# Patient Record
Sex: MALE | Race: WHITE | NOT HISPANIC OR LATINO | Employment: OTHER | URBAN - METROPOLITAN AREA
[De-identification: names, ages, dates, MRNs, and addresses within clinical notes are randomized per-mention and may not be internally consistent; named-entity substitution may affect disease eponyms.]

---

## 2018-02-04 ENCOUNTER — HOSPITAL ENCOUNTER (EMERGENCY)
Facility: OTHER | Age: 58
Discharge: HOME | End: 2018-02-04
Attending: EMERGENCY MEDICINE
Payer: COMMERCIAL

## 2018-02-04 VITALS
HEIGHT: 69 IN | BODY MASS INDEX: 29.62 KG/M2 | WEIGHT: 200 LBS | DIASTOLIC BLOOD PRESSURE: 89 MMHG | OXYGEN SATURATION: 97 % | SYSTOLIC BLOOD PRESSURE: 140 MMHG | RESPIRATION RATE: 16 BRPM | HEART RATE: 73 BPM | TEMPERATURE: 99 F

## 2018-02-04 DIAGNOSIS — L03.314 CELLULITIS OF LEFT GROIN: ICD-10-CM

## 2018-02-04 DIAGNOSIS — L02.214 ABSCESS OF LEFT GROIN: Primary | ICD-10-CM

## 2018-02-04 LAB — POCT GLUCOSE: 139 MG/DL (ref 70–110)

## 2018-02-04 PROCEDURE — 99283 EMERGENCY DEPT VISIT LOW MDM: CPT | Mod: 25

## 2018-02-04 PROCEDURE — 82962 GLUCOSE BLOOD TEST: CPT

## 2018-02-04 PROCEDURE — 25000003 PHARM REV CODE 250: Performed by: PHYSICIAN ASSISTANT

## 2018-02-04 PROCEDURE — 10061 I&D ABSCESS COMP/MULTIPLE: CPT

## 2018-02-04 RX ORDER — CLINDAMYCIN HYDROCHLORIDE 150 MG/1
450 CAPSULE ORAL 3 TIMES DAILY
Qty: 90 CAPSULE | Refills: 0 | Status: SHIPPED | OUTPATIENT
Start: 2018-02-04 | End: 2018-02-14

## 2018-02-04 RX ORDER — LIDOCAINE HYDROCHLORIDE 10 MG/ML
10 INJECTION INFILTRATION; PERINEURAL
Status: COMPLETED | OUTPATIENT
Start: 2018-02-04 | End: 2018-02-04

## 2018-02-04 RX ORDER — HYDROCODONE BITARTRATE AND ACETAMINOPHEN 5; 325 MG/1; MG/1
1 TABLET ORAL EVERY 6 HOURS PRN
Qty: 18 TABLET | Refills: 0 | Status: ON HOLD | OUTPATIENT
Start: 2018-02-04 | End: 2018-06-01 | Stop reason: CLARIF

## 2018-02-04 RX ORDER — HYDROCODONE BITARTRATE AND ACETAMINOPHEN 5; 325 MG/1; MG/1
1 TABLET ORAL
Status: COMPLETED | OUTPATIENT
Start: 2018-02-04 | End: 2018-02-04

## 2018-02-04 RX ADMIN — LIDOCAINE HYDROCHLORIDE 10 ML: 10 INJECTION, SOLUTION INFILTRATION; PERINEURAL at 10:02

## 2018-02-04 RX ADMIN — HYDROCODONE BITARTRATE AND ACETAMINOPHEN 1 TABLET: 5; 325 TABLET ORAL at 10:02

## 2018-02-04 NOTE — ED TRIAGE NOTES
"Patient presents to ER w/ reports of + left sided groin "cyst". Pt states," I get them all the time in this same spot. Sometimes they drain and just go away sometimes not. I can't seem to get this one to drain". Denies fever or chills.   "

## 2018-02-04 NOTE — ED NOTES
Two patient identifiers have been checked and are correct.      Appearance: Pt awake, alert & oriented to person, place & time. Pt in no acute distress at present time. Pt is clean and well groomed with clothes appropriately fastened.   Skin: Skin warm, dry & intact. Color consistent with ethnicity. Mucous membranes moist. No breakdown or brusing noted. + red, raised area noted to left side of groin, + warmth, pain w/ tenderness noted to site. No swelling noted to testicles.   Musculoskeletal: Patient moving all extremities well, no obvious swelling or deformities noted.   Respiratory: Respirations spontaneous, even, and non-labored. Visible chest rise noted. Airway is open and patent. No accessory muscle use noted.   Neurologic: Sensation is intact. Speech is clear and appropriate. Eyes open spontaneously, behavior appropriate to situation, follows commands, facial expression symmetrical, bilateral hand grasp equal and even, purposeful motor response noted.  Cardiac: All peripheral pulses present. No Bilateral lower extremity edema. Cap refill is <3 seconds.  Fall risk band applied to pt  Family remains at bedside.   Abdomen: Abdomen soft, non-tender to palpation.   : Pt reports no dysuria or hematuria.

## 2018-02-04 NOTE — ED PROVIDER NOTES
"Encounter Date: 2/4/2018       History     Chief Complaint   Patient presents with    Abscess     pt with c/o left groin "cyst" x one week.     57-year-old male with hypertension, coronary artery disease and esophageal spasm who is status post multiple cardiac stents and CABG presents to the emergency department with complaints of a "cyst" to the left groin.  He states that he's had these in the past.  He reports that he has had them drained previously.  He reports that his primary care physician called him in prescription for Bactrim and Cipro which she has been taking for the last couple of days.  He denies any drainage at this time, fever, chills.  He denies any history of diabetes.  He complains of pain that is a 4 out of 10.      The history is provided by the patient and the spouse.     Review of patient's allergies indicates:   Allergen Reactions    Plavix [clopidogrel] Itching     Past Medical History:   Diagnosis Date    Coronary artery disease     Esophageal spasm     Hypertension      Past Surgical History:   Procedure Laterality Date    Cardiac stents      CARDIAC SURGERY  3/2012    CABG  X  3    CORONARY ARTERY BYPASS GRAFT      triple    Ex. biopsy right neck      TONSILLECTOMY       Family History   Problem Relation Age of Onset    Heart disease Mother     Diabetes Mother     Heart disease Father     Heart disease Sister     Diabetes Sister     Heart disease Brother     Stroke Maternal Grandmother      Social History   Substance Use Topics    Smoking status: Former Smoker     Quit date: 7/15/2015    Smokeless tobacco: Never Used      Comment: 6  Black & Mild cigars per day    Alcohol use Yes      Comment: Occasionally     Review of Systems   Constitutional: Negative for chills and fever.   HENT: Negative for sore throat.    Respiratory: Negative for shortness of breath.    Cardiovascular: Negative for chest pain.   Gastrointestinal: Negative for nausea and vomiting. "   Genitourinary: Positive for scrotal swelling. Negative for dysuria, flank pain, frequency, hematuria and penile pain.        Cyst left groin   Musculoskeletal: Negative for back pain.   Skin: Positive for color change. Negative for rash and wound.   Neurological: Negative for weakness.   Hematological: Does not bruise/bleed easily.       Physical Exam     Initial Vitals [02/04/18 0929]   BP Pulse Resp Temp SpO2   (!) 153/82 93 18 98.5 °F (36.9 °C) 98 %      MAP       105.67         Physical Exam    Nursing note and vitals reviewed.  Constitutional: Vital signs are normal. He appears well-developed and well-nourished.  Non-toxic appearance. No distress.   HENT:   Head: Normocephalic and atraumatic.   Right Ear: External ear normal.   Left Ear: External ear normal.   Nose: Nose normal.   Eyes: Conjunctivae, EOM and lids are normal. Pupils are equal, round, and reactive to light. No scleral icterus.   Neck: Normal range of motion and phonation normal. Neck supple.   Abdominal: Normal appearance. There is no rebound.   Musculoskeletal: Normal range of motion.   No obvious deformities, moving all extremities, normal gait   Neurological: He is alert and oriented to person, place, and time. He has normal strength. No sensory deficit.   Skin: Skin is warm, dry and intact. Capillary refill takes less than 2 seconds. Abscess noted. No lesion and no rash noted. There is erythema.   Patient with area of erythema and induration without palpable fluctuance or drainage to the left groin.  It does not extend into the scrotum.  The area measures approximately 8 cm x 5 cm.   Psychiatric: He has a normal mood and affect. His speech is normal and behavior is normal. Judgment normal. Cognition and memory are normal.         ED Course   I & D - Incision and Drainage  Date/Time: 2/4/2018 11:02 AM  Performed by: ASHLI KUMAR  Authorized by: SARITA OROURKE   Consent Done: Not Needed  Type: abscess  Body area:  anogenital  Anesthesia: local infiltration    Anesthesia:  Local Anesthetic: lidocaine 1% without epinephrine  Anesthetic total: 6 mL  Patient sedated: no  Scalpel size: 11  Incision type: single straight  Complexity: complex  Drainage: bloody  Drainage amount: moderate  Wound treatment: incision and  drainage  Complications: No  Specimens: No  Implants: No  Patient tolerance: Patient tolerated the procedure well with no immediate complications        Labs Reviewed   POCT GLUCOSE - Abnormal; Notable for the following:        Result Value    POCT Glucose 139 (*)     All other components within normal limits   POCT GLUCOSE MONITORING CONTINUOUS             Medical Decision Making:   History:   I obtained history from: someone other than patient.       <> Summary of History: wife  Old Medical Records: I decided to obtain old medical records.  Initial Assessment:   57-year-old male with complaints consistent with abscess and cellulitis to the left groin.  Afebrile and neurovascularly intact.  He is alert, healthy and nontoxic appearing.  He is in no apparent distress.  No focal neurological deficits.  Exam documented above.  Cellulitis noted to the left groin with concerns for underlying abscess.  Does not involve the scrotum.  ED Management:  Patient's fingerstick glucose is 139.  Attempted to drain abscess with 11 blade.  The area was anesthetized using 1% lidocaine without epinephrine.  Patient tolerated well without immediate complications.  No purulent drainage expressed from the site.  Patient did have some bloody drainage.  At this time I did discuss with patient concerns for failed outpatient treatment as well as obtaining labs and possible admission.  Patient states that he does not wish to be admitted at this time.  Discussed with him  the risks which included possible worsening of symptoms, serious bacterial infection, sepsis, Fannie's gangrene.  Patient still declines at this time.  Will change his  antibiotics to clindamycin and given care instructions as well as strict return precautions.  We'll also discharge home with [place] for pain.  He and his wife state understanding.  He is to follow-up in the next 48 hours or return for any worsening signs or symptoms as directed.  This patient was discussed with the attending physician who agrees with treatment plan.    Other:   I have discussed this case with another health care provider.       <> Summary of the Discussion: Kisha  This note was created using Dragon Medical dictation.  There may be typographical errors secondary to dictation.                     ED Course      Clinical Impression:     1. Abscess of left groin    2. Cellulitis of left groin          Disposition:   Disposition: Discharged  Condition: Stable                        Sarah Gonsalez PA-C  02/04/18 1108

## 2018-04-26 ENCOUNTER — OFFICE VISIT (OUTPATIENT)
Dept: CARDIOLOGY | Facility: CLINIC | Age: 58
End: 2018-04-26
Attending: INTERNAL MEDICINE
Payer: COMMERCIAL

## 2018-04-26 VITALS
SYSTOLIC BLOOD PRESSURE: 127 MMHG | HEIGHT: 69 IN | BODY MASS INDEX: 31.25 KG/M2 | DIASTOLIC BLOOD PRESSURE: 81 MMHG | WEIGHT: 211 LBS | HEART RATE: 86 BPM

## 2018-04-26 DIAGNOSIS — I20.9 ANGINA PECTORIS: ICD-10-CM

## 2018-04-26 DIAGNOSIS — I25.119 ATHEROSCLEROSIS OF NATIVE CORONARY ARTERY OF NATIVE HEART WITH ANGINA PECTORIS: ICD-10-CM

## 2018-04-26 DIAGNOSIS — I10 ESSENTIAL HYPERTENSION: ICD-10-CM

## 2018-04-26 DIAGNOSIS — Z95.1 HX OF CABG: ICD-10-CM

## 2018-04-26 DIAGNOSIS — F17.200 CURRENT SMOKER: ICD-10-CM

## 2018-04-26 DIAGNOSIS — I20.9 ANGINAL SYNDROME: Primary | ICD-10-CM

## 2018-04-26 PROCEDURE — 3079F DIAST BP 80-89 MM HG: CPT | Mod: CPTII,S$GLB,, | Performed by: INTERNAL MEDICINE

## 2018-04-26 PROCEDURE — 3074F SYST BP LT 130 MM HG: CPT | Mod: CPTII,S$GLB,, | Performed by: INTERNAL MEDICINE

## 2018-04-26 PROCEDURE — 99214 OFFICE O/P EST MOD 30 MIN: CPT | Mod: S$GLB,,, | Performed by: INTERNAL MEDICINE

## 2018-04-26 NOTE — PROGRESS NOTES
Subjective:    Patient ID:  Rigo Garcia is a 57 y.o. male     HPI  Here for F/U of CAD, CABG, LASHON in proximal LCx in 9/2015, Angina, HBP, smoker.    I get angina on exertion. I now smoke 11/2 packs per day. I would like to try Chantix.    Current Outpatient Prescriptions   Medication Sig    aspirin 325 MG tablet Take 1 tablet (325 mg total) by mouth once daily.    clopidogrel (PLAVIX) 75 mg tablet TAKE ONE TABLET BY MOUTH DAILY    FOLBIC 2.5-25-2 mg Tab TAKE ONE TABLET BY MOUTH DAILY    hydroCHLOROthiazide (HYDRODIURIL) 25 MG tablet TAKE ONE TABLET BY MOUTH EVERY DAY    hydrocodone-acetaminophen 5-325mg (NORCO) 5-325 mg per tablet Take 1 tablet by mouth every 6 (six) hours as needed for Pain.    metoprolol succinate (TOPROL-XL) 50 MG 24 hr tablet TAKE ONE TABLET BY MOUTH TWICE DAILY    NIFEdipine (PROCARDIA-XL) 30 MG (OSM) 24 hr tablet TAKE ONE TABLET BY MOUTH TWICE DAILY    nitroGLYCERIN (NITROSTAT) 0.4 MG SL tablet TAKE 1 UNDER THE TONGUE EVERY 5 MINUTES FOR 3 DOSES AS NEEDED FOR CHEST PAIN/IF NO RELIEF CALL ER    omeprazole (PRILOSEC) 40 MG capsule Take 40 mg by mouth every morning.     RANEXA 500 mg Tb12     rosuvastatin (CRESTOR) 10 MG tablet Take 10 mg by mouth every Monday and Thursday.    testosterone cypionate (DEPOTESTOTERONE CYPIONATE) 200 mg/mL injection Inject into the muscle every 14 (fourteen) days.     No current facility-administered medications for this visit.          Review of Systems   Constitution: Negative for chills, decreased appetite, fever, weight gain and weight loss.   HENT: Negative for congestion, hearing loss and sore throat.    Eyes: Negative for blurred vision, double vision and visual disturbance.   Cardiovascular: Positive for chest pain. Negative for claudication, dyspnea on exertion, leg swelling, palpitations and syncope.   Respiratory: Negative for cough, hemoptysis, shortness of breath, sputum production and wheezing.    Endocrine: Negative for cold  "intolerance and heat intolerance.   Hematologic/Lymphatic: Negative for bleeding problem. Does not bruise/bleed easily.   Skin: Negative for color change, dry skin, flushing and itching.   Musculoskeletal: Negative for back pain, joint pain and myalgias.   Gastrointestinal: Negative for abdominal pain, anorexia, constipation, diarrhea, dysphagia, nausea and vomiting.        No bleeding per rectum   Genitourinary: Negative for dysuria, flank pain, frequency, hematuria and nocturia.   Neurological: Negative for dizziness, headaches, light-headedness, loss of balance, seizures and tremors.   Psychiatric/Behavioral: Negative for altered mental status and depression.         Vitals:    04/26/18 1428   BP: 127/81   Pulse: 86   Weight: 95.7 kg (211 lb)   Height: 5' 9" (1.753 m)     Objective:    Physical Exam   Constitutional: He is oriented to person, place, and time. He appears well-developed and well-nourished.   HENT:   Head: Normocephalic and atraumatic.   Right Ear: External ear normal.   Left Ear: External ear normal.   Nose: Nose normal.   Eyes: Conjunctivae and EOM are normal. Pupils are equal, round, and reactive to light. No scleral icterus.   Neck: Normal range of motion. Neck supple. No JVD present. No tracheal deviation present. No thyromegaly present.   Cardiovascular: Normal rate, regular rhythm and normal heart sounds.  Exam reveals no gallop and no friction rub.    No murmur heard.  Pulmonary/Chest: Effort normal and breath sounds normal. No respiratory distress. He has no rales. He exhibits no tenderness.   Abdominal: Soft. Bowel sounds are normal. He exhibits no distension and no mass. There is no tenderness.   Musculoskeletal: Normal range of motion. He exhibits no edema or tenderness.   Lymphadenopathy:     He has no cervical adenopathy.   Neurological: He is alert and oriented to person, place, and time. He has normal reflexes. No cranial nerve deficit. Coordination normal.   Skin: Skin is warm and " dry. No rash noted.   Psychiatric: He has a normal mood and affect. His behavior is normal.         Assessment:       1. Anginal syndrome    2. Angina pectoris    3. Atherosclerosis of native coronary artery of native heart with angina pectoris    4. Hx of CABG    5. Essential hypertension    6. Current smoker         Plan:       Rx Chantix  Lexiscan CL test.

## 2018-05-24 ENCOUNTER — CLINICAL SUPPORT (OUTPATIENT)
Dept: CARDIOLOGY | Facility: CLINIC | Age: 58
End: 2018-05-24
Payer: COMMERCIAL

## 2018-05-24 DIAGNOSIS — I20.9 ANGINA PECTORIS: ICD-10-CM

## 2018-05-24 PROCEDURE — A9500 TC99M SESTAMIBI: HCPCS | Mod: S$GLB,,, | Performed by: INTERNAL MEDICINE

## 2018-05-24 PROCEDURE — 78452 HT MUSCLE IMAGE SPECT MULT: CPT | Mod: S$GLB,,, | Performed by: INTERNAL MEDICINE

## 2018-05-24 PROCEDURE — 93015 CV STRESS TEST SUPVJ I&R: CPT | Mod: S$GLB,,, | Performed by: INTERNAL MEDICINE

## 2018-05-25 ENCOUNTER — TELEPHONE (OUTPATIENT)
Dept: CARDIOLOGY | Facility: CLINIC | Age: 58
End: 2018-05-25

## 2018-05-29 ENCOUNTER — OFFICE VISIT (OUTPATIENT)
Dept: CARDIOLOGY | Facility: CLINIC | Age: 58
End: 2018-05-29
Attending: INTERNAL MEDICINE
Payer: COMMERCIAL

## 2018-05-29 VITALS
HEIGHT: 69 IN | DIASTOLIC BLOOD PRESSURE: 87 MMHG | WEIGHT: 211 LBS | HEART RATE: 90 BPM | SYSTOLIC BLOOD PRESSURE: 131 MMHG | BODY MASS INDEX: 31.25 KG/M2

## 2018-05-29 DIAGNOSIS — I10 ESSENTIAL HYPERTENSION: ICD-10-CM

## 2018-05-29 DIAGNOSIS — I20.9 ANGINA PECTORIS: Primary | ICD-10-CM

## 2018-05-29 DIAGNOSIS — Z95.1 HX OF CABG: ICD-10-CM

## 2018-05-29 DIAGNOSIS — F17.200 CURRENT SMOKER: ICD-10-CM

## 2018-05-29 PROCEDURE — 3008F BODY MASS INDEX DOCD: CPT | Mod: CPTII,S$GLB,, | Performed by: INTERNAL MEDICINE

## 2018-05-29 PROCEDURE — 99214 OFFICE O/P EST MOD 30 MIN: CPT | Mod: S$GLB,,, | Performed by: INTERNAL MEDICINE

## 2018-05-29 PROCEDURE — 3075F SYST BP GE 130 - 139MM HG: CPT | Mod: CPTII,S$GLB,, | Performed by: INTERNAL MEDICINE

## 2018-05-29 PROCEDURE — 3079F DIAST BP 80-89 MM HG: CPT | Mod: CPTII,S$GLB,, | Performed by: INTERNAL MEDICINE

## 2018-05-29 NOTE — PROGRESS NOTES
Subjective:    Patient ID:  Rigo Garcia is a 58 y.o. male     HPI  here for follow-up of angina pectoris, coronary artery disease, history of previous aortocoronary bypass surgery, essential hypertension, current cigarette smoker.  He last had a coronary intervention with a 3.5 mm drug-eluting stent in the proximal left circumflex coronary artery in 2015.    I now get short of breath especially when I am carrying something and get a light heaviness across the chest.  It goes away pretty soon, I do not need to take nitroglycerin.  The Cardiolite test was positive for reversible ischemia.    I smoke more than a pack of cigarettes a day.    Current Outpatient Prescriptions   Medication Sig    aspirin 325 MG tablet Take 1 tablet (325 mg total) by mouth once daily.    clopidogrel (PLAVIX) 75 mg tablet TAKE ONE TABLET BY MOUTH DAILY    FOLBIC 2.5-25-2 mg Tab TAKE ONE TABLET BY MOUTH DAILY    hydroCHLOROthiazide (HYDRODIURIL) 25 MG tablet TAKE ONE TABLET BY MOUTH EVERY DAY    hydrocodone-acetaminophen 5-325mg (NORCO) 5-325 mg per tablet Take 1 tablet by mouth every 6 (six) hours as needed for Pain.    metoprolol succinate (TOPROL-XL) 50 MG 24 hr tablet TAKE ONE TABLET BY MOUTH TWICE DAILY    NIFEdipine (PROCARDIA-XL) 30 MG (OSM) 24 hr tablet TAKE ONE TABLET BY MOUTH TWICE DAILY    nitroGLYCERIN (NITROSTAT) 0.4 MG SL tablet TAKE 1 UNDER THE TONGUE EVERY 5 MINUTES FOR 3 DOSES AS NEEDED FOR CHEST PAIN/IF NO RELIEF CALL ER    omeprazole (PRILOSEC) 40 MG capsule Take 40 mg by mouth every morning.     RANEXA 500 mg Tb12     rosuvastatin (CRESTOR) 10 MG tablet Take 10 mg by mouth every Monday and Thursday.    testosterone cypionate (DEPOTESTOTERONE CYPIONATE) 200 mg/mL injection Inject into the muscle every 14 (fourteen) days.     No current facility-administered medications for this visit.          Review of Systems   Constitution: Negative for chills, decreased appetite, fever, weight gain and weight loss.  "  HENT: Negative for congestion, hearing loss and sore throat.    Eyes: Negative for blurred vision, double vision and visual disturbance.   Cardiovascular: Positive for chest pain and dyspnea on exertion. Negative for claudication, leg swelling, palpitations and syncope.   Respiratory: Negative for cough, hemoptysis, shortness of breath, sputum production and wheezing.    Endocrine: Negative for cold intolerance and heat intolerance.   Hematologic/Lymphatic: Negative for bleeding problem. Does not bruise/bleed easily.   Skin: Negative for color change, dry skin, flushing and itching.   Musculoskeletal: Negative for back pain, joint pain and myalgias.   Gastrointestinal: Negative for abdominal pain, anorexia, constipation, diarrhea, dysphagia, nausea and vomiting.        No bleeding per rectum   Genitourinary: Negative for dysuria, flank pain, frequency, hematuria and nocturia.   Neurological: Negative for dizziness, headaches, light-headedness, loss of balance, seizures and tremors.   Psychiatric/Behavioral: Negative for altered mental status and depression.         Vitals:    05/29/18 1311   BP: 131/87   Pulse: 90   Weight: 95.7 kg (211 lb)   Height: 5' 9" (1.753 m)     Objective:    Physical Exam   Constitutional: He is oriented to person, place, and time. He appears well-developed and well-nourished.   HENT:   Head: Normocephalic and atraumatic.   Right Ear: External ear normal.   Left Ear: External ear normal.   Nose: Nose normal.   Eyes: Conjunctivae and EOM are normal. Pupils are equal, round, and reactive to light. No scleral icterus.   Neck: Normal range of motion. Neck supple. No JVD present. No tracheal deviation present. No thyromegaly present.   Cardiovascular: Normal rate, regular rhythm and normal heart sounds.  Exam reveals no gallop and no friction rub.    No murmur heard.  Pulmonary/Chest: Effort normal and breath sounds normal. No respiratory distress. He has no rales. He exhibits no tenderness. "   Abdominal: Soft. Bowel sounds are normal. He exhibits no distension and no mass. There is no tenderness.   Musculoskeletal: Normal range of motion. He exhibits no edema or tenderness.   Lymphadenopathy:     He has no cervical adenopathy.   Neurological: He is alert and oriented to person, place, and time. He has normal reflexes. No cranial nerve deficit. Coordination normal.   Skin: Skin is warm and dry. No rash noted.   Psychiatric: He has a normal mood and affect. His behavior is normal.       the Cardiolite images reviewed with the patient and his wife    Assessment:       1. Angina pectoris    2. Hx of CABG    3. Essential hypertension    4. Current smoker         Plan:       Plan coronary angiography and possible transcatheter therapeutic  Intervention as warranted.  Discussed in great details with patient and his wife.    Again urged smoking cessation.  Given a prescription for Nicoderm patches.

## 2018-05-31 DIAGNOSIS — I20.9 ANGINAL SYNDROME: ICD-10-CM

## 2018-06-01 ENCOUNTER — HOSPITAL ENCOUNTER (OUTPATIENT)
Facility: OTHER | Age: 58
Discharge: HOME | End: 2018-06-01
Attending: INTERNAL MEDICINE | Admitting: INTERNAL MEDICINE
Payer: COMMERCIAL

## 2018-06-01 ENCOUNTER — SURGERY (OUTPATIENT)
Age: 58
End: 2018-06-01

## 2018-06-01 VITALS
BODY MASS INDEX: 30.36 KG/M2 | HEART RATE: 71 BPM | RESPIRATION RATE: 20 BRPM | HEIGHT: 69 IN | SYSTOLIC BLOOD PRESSURE: 127 MMHG | WEIGHT: 205 LBS | OXYGEN SATURATION: 95 % | DIASTOLIC BLOOD PRESSURE: 61 MMHG | TEMPERATURE: 98 F

## 2018-06-01 DIAGNOSIS — R94.30 ABNORMAL RESULT OF CARDIOVASCULAR FUNCTION STUDY: ICD-10-CM

## 2018-06-01 DIAGNOSIS — I25.119 ATHEROSCLEROSIS OF NATIVE CORONARY ARTERY OF NATIVE HEART WITH ANGINA PECTORIS: Primary | ICD-10-CM

## 2018-06-01 LAB
ANION GAP SERPL CALC-SCNC: 11 MMOL/L
BASOPHILS # BLD AUTO: 0.06 K/UL
BASOPHILS NFR BLD: 0.7 %
BUN SERPL-MCNC: 16 MG/DL
CALCIUM SERPL-MCNC: 10.5 MG/DL
CHLORIDE SERPL-SCNC: 104 MMOL/L
CO2 SERPL-SCNC: 24 MMOL/L
CORONARY STENOSIS: ABNORMAL
CREAT SERPL-MCNC: 1.1 MG/DL
DIFFERENTIAL METHOD: NORMAL
EOSINOPHIL # BLD AUTO: 0.3 K/UL
EOSINOPHIL NFR BLD: 3.1 %
ERYTHROCYTE [DISTWIDTH] IN BLOOD BY AUTOMATED COUNT: 13.6 %
EST. GFR  (AFRICAN AMERICAN): >60 ML/MIN/1.73 M^2
EST. GFR  (NON AFRICAN AMERICAN): >60 ML/MIN/1.73 M^2
GLUCOSE SERPL-MCNC: 106 MG/DL
HCT VFR BLD AUTO: 49.8 %
HGB BLD-MCNC: 16.8 G/DL
LYMPHOCYTES # BLD AUTO: 2.7 K/UL
LYMPHOCYTES NFR BLD: 30.3 %
MCH RBC QN AUTO: 30.4 PG
MCHC RBC AUTO-ENTMCNC: 33.7 G/DL
MCV RBC AUTO: 90 FL
MONOCYTES # BLD AUTO: 0.9 K/UL
MONOCYTES NFR BLD: 10 %
NEUTROPHILS # BLD AUTO: 5 K/UL
NEUTROPHILS NFR BLD: 55.6 %
PLATELET # BLD AUTO: 191 K/UL
PMV BLD AUTO: 10.6 FL
POTASSIUM SERPL-SCNC: 3.8 MMOL/L
RBC # BLD AUTO: 5.52 M/UL
SODIUM SERPL-SCNC: 139 MMOL/L
WBC # BLD AUTO: 8.94 K/UL

## 2018-06-01 PROCEDURE — 25000003 PHARM REV CODE 250: Performed by: INTERNAL MEDICINE

## 2018-06-01 PROCEDURE — 80048 BASIC METABOLIC PNL TOTAL CA: CPT

## 2018-06-01 PROCEDURE — 85025 COMPLETE CBC W/AUTO DIFF WBC: CPT

## 2018-06-01 PROCEDURE — 25000003 PHARM REV CODE 250

## 2018-06-01 PROCEDURE — C1769 GUIDE WIRE: HCPCS

## 2018-06-01 PROCEDURE — 63600175 PHARM REV CODE 636 W HCPCS

## 2018-06-01 PROCEDURE — 36415 COLL VENOUS BLD VENIPUNCTURE: CPT

## 2018-06-01 PROCEDURE — 25500020 PHARM REV CODE 255

## 2018-06-01 RX ORDER — HYDROCODONE BITARTRATE AND ACETAMINOPHEN 10; 325 MG/1; MG/1
1 TABLET ORAL EVERY 4 HOURS PRN
Status: DISCONTINUED | OUTPATIENT
Start: 2018-06-01 | End: 2018-06-01 | Stop reason: HOSPADM

## 2018-06-01 RX ORDER — DIPHENHYDRAMINE HCL 25 MG
25 CAPSULE ORAL ONCE
Status: COMPLETED | OUTPATIENT
Start: 2018-06-01 | End: 2018-06-01

## 2018-06-01 RX ORDER — DIAZEPAM 5 MG/1
5 TABLET ORAL
Status: COMPLETED | OUTPATIENT
Start: 2018-06-01 | End: 2018-06-01

## 2018-06-01 RX ORDER — SODIUM CHLORIDE 9 MG/ML
50 INJECTION, SOLUTION INTRAVENOUS CONTINUOUS
Status: DISPENSED | OUTPATIENT
Start: 2018-06-01 | End: 2018-06-01

## 2018-06-01 RX ORDER — HYDROCODONE BITARTRATE AND ACETAMINOPHEN 5; 325 MG/1; MG/1
1 TABLET ORAL EVERY 4 HOURS PRN
Status: DISCONTINUED | OUTPATIENT
Start: 2018-06-01 | End: 2018-06-01 | Stop reason: HOSPADM

## 2018-06-01 RX ORDER — NITROGLYCERIN 0.4 MG/1
0.4 TABLET SUBLINGUAL
Status: DISCONTINUED | OUTPATIENT
Start: 2018-06-01 | End: 2018-06-01 | Stop reason: HOSPADM

## 2018-06-01 RX ORDER — ROSUVASTATIN CALCIUM 20 MG/1
20 TABLET, COATED ORAL DAILY
Qty: 30 TABLET | Refills: 6 | Status: SHIPPED | OUTPATIENT
Start: 2018-06-01 | End: 2018-12-12 | Stop reason: SDUPTHER

## 2018-06-01 RX ORDER — NAPROXEN SODIUM 220 MG/1
81 TABLET, FILM COATED ORAL
Status: DISCONTINUED | OUTPATIENT
Start: 2018-06-01 | End: 2018-06-01 | Stop reason: HOSPADM

## 2018-06-01 RX ORDER — ONDANSETRON 2 MG/ML
4 INJECTION INTRAMUSCULAR; INTRAVENOUS EVERY 12 HOURS PRN
Status: DISCONTINUED | OUTPATIENT
Start: 2018-06-01 | End: 2018-06-01 | Stop reason: HOSPADM

## 2018-06-01 RX ORDER — NITROGLYCERIN 0.4 MG/1
0.4 TABLET SUBLINGUAL EVERY 5 MIN PRN
Status: DISCONTINUED | OUTPATIENT
Start: 2018-06-01 | End: 2018-06-01 | Stop reason: HOSPADM

## 2018-06-01 RX ORDER — ACETAMINOPHEN 325 MG/1
650 TABLET ORAL EVERY 4 HOURS PRN
Status: DISCONTINUED | OUTPATIENT
Start: 2018-06-01 | End: 2018-06-01 | Stop reason: HOSPADM

## 2018-06-01 RX ORDER — DIPHENHYDRAMINE HYDROCHLORIDE 50 MG/ML
25 INJECTION INTRAMUSCULAR; INTRAVENOUS EVERY 6 HOURS PRN
Status: DISCONTINUED | OUTPATIENT
Start: 2018-06-01 | End: 2018-06-01 | Stop reason: HOSPADM

## 2018-06-01 RX ORDER — ROSUVASTATIN CALCIUM 20 MG/1
10 TABLET, COATED ORAL
Qty: 30 TABLET | Refills: 6 | Status: SHIPPED | OUTPATIENT
Start: 2018-06-04 | End: 2018-06-01

## 2018-06-01 RX ORDER — SODIUM CHLORIDE 9 MG/ML
INJECTION, SOLUTION INTRAVENOUS CONTINUOUS
Status: DISCONTINUED | OUTPATIENT
Start: 2018-06-01 | End: 2018-06-01 | Stop reason: HOSPADM

## 2018-06-01 RX ORDER — PANTOPRAZOLE SODIUM 40 MG/1
40 TABLET, DELAYED RELEASE ORAL DAILY
Qty: 30 TABLET | Refills: 11 | Status: SHIPPED | OUTPATIENT
Start: 2018-06-01 | End: 2019-04-08 | Stop reason: SDUPTHER

## 2018-06-01 RX ORDER — GLUCOSAMINE/CHONDRO SU A 500-400 MG
1 TABLET ORAL 3 TIMES DAILY
COMMUNITY

## 2018-06-01 RX ORDER — MAG HYDROX/ALUMINUM HYD/SIMETH 200-200-20
30 SUSPENSION, ORAL (FINAL DOSE FORM) ORAL
Status: DISCONTINUED | OUTPATIENT
Start: 2018-06-01 | End: 2018-06-01 | Stop reason: HOSPADM

## 2018-06-01 RX ORDER — NAPROXEN SODIUM 220 MG/1
162 TABLET, FILM COATED ORAL
Status: DISCONTINUED | OUTPATIENT
Start: 2018-06-01 | End: 2018-06-01 | Stop reason: HOSPADM

## 2018-06-01 RX ADMIN — DIPHENHYDRAMINE HYDROCHLORIDE 25 MG: 25 CAPSULE ORAL at 07:06

## 2018-06-01 RX ADMIN — SODIUM CHLORIDE: 0.9 INJECTION, SOLUTION INTRAVENOUS at 06:06

## 2018-06-01 RX ADMIN — DIAZEPAM 5 MG: 5 TABLET ORAL at 07:06

## 2018-06-01 NOTE — INTERVAL H&P NOTE
The patient has been examined and the H&P has been reviewed:    I concur with the findings and no changes have occurred since H&P was written.    Anesthesia/Surgery risks, benefits and alternative options discussed and understood by patient/family.          Active Hospital Problems    Diagnosis  POA    Atherosclerosis of native coronary artery of native heart with angina pectoris [I25.119]  Yes      Resolved Hospital Problems    Diagnosis Date Resolved POA   No resolved problems to display.

## 2018-06-01 NOTE — DISCHARGE SUMMARY
Dictation #1  MRN:[id number]  CSN:[id number]  Dictation #2  MRN:[id number]  CSN:[id number]  Dictation #3  MRN:[id number]  CSN:[id number]

## 2018-06-01 NOTE — DISCHARGE INSTRUCTIONS
Instructions Upon Discharge for Patient Following Cardiac Cath  1. Activity   Following a period of 8Hrs of bedrest in the hospital, you may be discharged by your physician. It is not advisable to drive your self home.    After returning home, you should confine your activities to merely sitting up at mealtime or getting up to use the bathroom, until the following morning.     2. Dressing   When you are sent home from the hospital, you will have a firm dressing on your groin. This should remain dry and intact until the morning after the procedure. If you experience a sensation of warmth, wetness or have blood oozing from the area of the dressing, remove the dressing and apply firm pressure one inch above the entry site.  This pressure should be held continually for 10 to 15 minutes while you have someone call your physician.    Providing you have not experienced any of the complications listed above, the dressing may be removed on the morning after the procedure.  This can best be accomplished by saturating the dressing in the shower.  After removing the dressing, a Band-aid can be place at the site.    3. Pain   If you should have any pain in your foot or leg, any swelling in the groin or any abdominal pain, you should contact your physician immediately.    4. Comments          5. Emergency   If you are unable to contact your physician, please report to the nearest Emergency Room.      Anesthesia: Monitored Anesthesia Care (MAC)    Anesthesia Safety  · Have an adult family member or friend drive you home after the procedure.  · For the first 24 hours after your surgery:  ¨ Do not drive or use heavy equipment.  ¨ Do not make important decisions or sign documents.  ¨ Avoid alcohol.  ¨ Have someone stay with you, if possible. They can watch for problems and help keep you safe.

## 2018-06-02 NOTE — DISCHARGE SUMMARY
HISTORY OF PRESENT ILLNESS:  Mr. Garcia is a 58-year-old gentleman who was   admitted with complaints of exertional breathlessness and exertional tightness   in the chest.  He has a previous history of triple vessel aortocoronary bypass   surgery, essential hypertension and current cigarette smoker.  He has had an   angioplasty and stent of his mid left circumflex coronary artery using a   drug-eluting stent.  Most recently in 2015, he had placement of a 3.5 mm   drug-eluting stent in the proximal left circumflex coronary artery.  At that   time, the saphenous vein bypass graft to the right coronary artery was patent   and the LIMA to the left anterior descending coronary artery was patent.  He had   a Cardiolite test, which showed reversible ischemia and he was admitted for   re-angiography.  Note, he says he smokes more than a pack of cigarettes a day.    At angiography, he was noted to have an occluded mid left anterior descending   and proximal right coronary artery.  The stented proximal left circumflex   coronary artery was widely patent, the stented mid left circumflex coronary   artery was also widely patent.  There was no flow restrictive disease throughout   the territory of the left circumflex coronary artery.  The left circumflex   coronary artery was noted to fill the distal right coronary artery in a   retrograde fashion through collaterals.  The proximal right coronary artery was   occluded, the vein graft to the right coronary artery was also occluded.  The   LIMA to the left anterior descending coronary artery was patent.  The branches   of the LIMA; however, were not tied off and in addition to the LIMA to the left   anterior descending, the side branches of the LIMA were all noted to be widely   patent.  He had angina during injection of the left internal mammary artery.  In   summary, he has a patent LIMA and a patent native left circumflex, the right   coronary artery and the bypass graft to the  right coronary artery are occluded;   however, there is extensive collateralization from the left circumflex coronary   artery in the distribution of the distal right coronary artery.  The plan is to   continue medical management, I have urged him to continue the aspirin and   clopidogrel, to discontinue the omeprazole and placed him on pantoprazole.  He   has again been counseled regarding smoking cessation and regarding his   medications and physical activities, etc.  He will see me for followup in the   office in 2 weeks.      VARUN/RICHARD  dd: 06/01/2018 09:05:26 (CDT)  td: 06/02/2018 00:55:37 (CDT)  Doc ID   #[id number]  Job ID #[id number]    CC:

## 2018-06-07 ENCOUNTER — OFFICE VISIT (OUTPATIENT)
Dept: CARDIOLOGY | Facility: CLINIC | Age: 58
End: 2018-06-07
Attending: INTERNAL MEDICINE
Payer: COMMERCIAL

## 2018-06-07 ENCOUNTER — CLINICAL SUPPORT (OUTPATIENT)
Dept: CARDIOLOGY | Facility: CLINIC | Age: 58
End: 2018-06-07
Payer: COMMERCIAL

## 2018-06-07 VITALS
HEART RATE: 78 BPM | BODY MASS INDEX: 31.1 KG/M2 | WEIGHT: 210 LBS | HEIGHT: 69 IN | DIASTOLIC BLOOD PRESSURE: 77 MMHG | SYSTOLIC BLOOD PRESSURE: 121 MMHG

## 2018-06-07 DIAGNOSIS — T14.8XXA HEMATOMA: ICD-10-CM

## 2018-06-07 DIAGNOSIS — I25.119 ATHEROSCLEROSIS OF NATIVE CORONARY ARTERY OF NATIVE HEART WITH ANGINA PECTORIS: Primary | ICD-10-CM

## 2018-06-07 DIAGNOSIS — F17.200 CURRENT SMOKER: ICD-10-CM

## 2018-06-07 DIAGNOSIS — Z95.1 HX OF CABG: ICD-10-CM

## 2018-06-07 DIAGNOSIS — E78.5 HYPERLIPIDEMIA, UNSPECIFIED HYPERLIPIDEMIA TYPE: ICD-10-CM

## 2018-06-07 PROCEDURE — 99214 OFFICE O/P EST MOD 30 MIN: CPT | Mod: S$GLB,,, | Performed by: INTERNAL MEDICINE

## 2018-06-07 PROCEDURE — 93925 LOWER EXTREMITY STUDY: CPT | Mod: S$GLB,,, | Performed by: INTERNAL MEDICINE

## 2018-06-07 PROCEDURE — 3078F DIAST BP <80 MM HG: CPT | Mod: CPTII,S$GLB,, | Performed by: INTERNAL MEDICINE

## 2018-06-07 PROCEDURE — 3008F BODY MASS INDEX DOCD: CPT | Mod: CPTII,S$GLB,, | Performed by: INTERNAL MEDICINE

## 2018-06-07 PROCEDURE — 3074F SYST BP LT 130 MM HG: CPT | Mod: CPTII,S$GLB,, | Performed by: INTERNAL MEDICINE

## 2018-06-07 NOTE — PROGRESS NOTES
Subjective:    Patient ID:  Rigo Garcia is a 58 y.o. male     HPI  here for evaluation of a swelling of the right groin after recent angiography    I have the swelling of the right groin after the angiogram on the 1st of June.  Not much pain, but wanted to get it checked out.    Current Outpatient Prescriptions   Medication Sig    aspirin 325 MG tablet Take 1 tablet (325 mg total) by mouth once daily.    clopidogrel (PLAVIX) 75 mg tablet TAKE ONE TABLET BY MOUTH DAILY    FOLBIC 2.5-25-2 mg Tab TAKE ONE TABLET BY MOUTH DAILY    glucosamine-chondroitin 500-400 mg tablet Take 1 tablet by mouth 3 (three) times daily.    hydroCHLOROthiazide (HYDRODIURIL) 25 MG tablet TAKE ONE TABLET BY MOUTH EVERY DAY    metoprolol succinate (TOPROL-XL) 50 MG 24 hr tablet TAKE ONE TABLET BY MOUTH TWICE DAILY    multivit-mins/iron/folic/lycop (CENTRUM MEN ORAL) Take 1 tablet by mouth once daily.    NIFEdipine (PROCARDIA-XL) 30 MG (OSM) 24 hr tablet TAKE ONE TABLET BY MOUTH TWICE DAILY    nitroGLYCERIN (NITROSTAT) 0.4 MG SL tablet TAKE 1 UNDER THE TONGUE EVERY 5 MINUTES FOR 3 DOSES AS NEEDED FOR CHEST PAIN/IF NO RELIEF CALL ER    pantoprazole (PROTONIX) 40 MG tablet Take 1 tablet (40 mg total) by mouth once daily.    rosuvastatin (CRESTOR) 20 MG tablet Take 1 tablet (20 mg total) by mouth once daily.     No current facility-administered medications for this visit.          Review of Systems   Constitution: Negative for chills, decreased appetite, fever, weight gain and weight loss.   HENT: Negative for congestion, hearing loss and sore throat.    Eyes: Negative for blurred vision, double vision and visual disturbance.   Cardiovascular: Negative for chest pain, claudication, dyspnea on exertion, leg swelling, palpitations and syncope.   Respiratory: Negative for cough, hemoptysis, shortness of breath, sputum production and wheezing.    Endocrine: Negative for cold intolerance and heat intolerance.   Hematologic/Lymphatic:  "Negative for bleeding problem. Does not bruise/bleed easily.   Skin: Negative for color change, dry skin, flushing and itching.   Musculoskeletal: Negative for back pain, joint pain and myalgias.        Right groin swelling   Gastrointestinal: Negative for abdominal pain, anorexia, constipation, diarrhea, dysphagia, nausea and vomiting.        No bleeding per rectum   Genitourinary: Negative for dysuria, flank pain, frequency, hematuria and nocturia.   Neurological: Negative for dizziness, headaches, light-headedness, loss of balance, seizures and tremors.   Psychiatric/Behavioral: Negative for altered mental status and depression.         Vitals:    06/07/18 1302   BP: 121/77   Pulse: 78   Weight: 95.3 kg (210 lb)   Height: 5' 9" (1.753 m)     Objective:    Physical Exam   Constitutional: He is oriented to person, place, and time. He appears well-developed and well-nourished.   HENT:   Head: Normocephalic and atraumatic.   Right Ear: External ear normal.   Left Ear: External ear normal.   Nose: Nose normal.   Eyes: Conjunctivae and EOM are normal. Pupils are equal, round, and reactive to light. No scleral icterus.   Neck: Normal range of motion. Neck supple. No JVD present. No tracheal deviation present. No thyromegaly present.   Cardiovascular: Normal rate, regular rhythm and normal heart sounds.  Exam reveals no gallop and no friction rub.    No murmur heard.  Pulmonary/Chest: Effort normal and breath sounds normal. No respiratory distress. He has no rales. He exhibits no tenderness.   Abdominal: Soft. Bowel sounds are normal. He exhibits no distension and no mass. There is no tenderness.   Musculoskeletal: Normal range of motion. He exhibits no edema or tenderness.   Localized 1-1/2 inch by 1 in firm mobile swelling above the entry point of the right femoral arteriotomy site.   Lymphadenopathy:     He has no cervical adenopathy.   Neurological: He is alert and oriented to person, place, and time. He has normal " reflexes. No cranial nerve deficit. Coordination normal.   Skin: Skin is warm and dry. No rash noted.   Psychiatric: He has a normal mood and affect. His behavior is normal.         Assessment:       1. Atherosclerosis of native coronary artery of native heart with angina pectoris    2. Hematoma    3. Hx of CABG    4. Hyperlipidemia, unspecified hyperlipidemia type    5. Current smoker         Plan:       Ultrasound  right groin reviewed, no pseudoaneurysm.    Reassured

## 2018-07-03 ENCOUNTER — OFFICE VISIT (OUTPATIENT)
Dept: CARDIOLOGY | Facility: CLINIC | Age: 58
End: 2018-07-03
Attending: INTERNAL MEDICINE
Payer: COMMERCIAL

## 2018-07-03 VITALS
DIASTOLIC BLOOD PRESSURE: 69 MMHG | SYSTOLIC BLOOD PRESSURE: 123 MMHG | HEART RATE: 89 BPM | HEIGHT: 69 IN | BODY MASS INDEX: 31.7 KG/M2 | WEIGHT: 214 LBS

## 2018-07-03 DIAGNOSIS — I25.119 ATHEROSCLEROSIS OF NATIVE CORONARY ARTERY OF NATIVE HEART WITH ANGINA PECTORIS: ICD-10-CM

## 2018-07-03 DIAGNOSIS — I10 ESSENTIAL HYPERTENSION: ICD-10-CM

## 2018-07-03 DIAGNOSIS — F17.200 CURRENT SMOKER: ICD-10-CM

## 2018-07-03 DIAGNOSIS — E78.5 HYPERLIPIDEMIA, UNSPECIFIED HYPERLIPIDEMIA TYPE: ICD-10-CM

## 2018-07-03 DIAGNOSIS — I20.9 ANGINA PECTORIS: Primary | ICD-10-CM

## 2018-07-03 DIAGNOSIS — Z95.1 HX OF CABG: ICD-10-CM

## 2018-07-03 PROCEDURE — 3078F DIAST BP <80 MM HG: CPT | Mod: CPTII,S$GLB,, | Performed by: INTERNAL MEDICINE

## 2018-07-03 PROCEDURE — 99214 OFFICE O/P EST MOD 30 MIN: CPT | Mod: S$GLB,,, | Performed by: INTERNAL MEDICINE

## 2018-07-03 PROCEDURE — 3074F SYST BP LT 130 MM HG: CPT | Mod: CPTII,S$GLB,, | Performed by: INTERNAL MEDICINE

## 2018-07-03 PROCEDURE — 3008F BODY MASS INDEX DOCD: CPT | Mod: CPTII,S$GLB,, | Performed by: INTERNAL MEDICINE

## 2018-07-03 NOTE — PROGRESS NOTES
Subjective:    Patient ID:  Rigo Garcia is a 58 y.o. male     HPI    ROS     Objective:    Physical Exam      Assessment:       1. Angina pectoris    2. Atherosclerosis of native coronary artery of native heart with angina pectoris    3. Hx of CABG    4. Essential hypertension    5. Hyperlipidemia, unspecified hyperlipidemia type    6. Current smoker         Plan:

## 2018-07-03 NOTE — PROGRESS NOTES
Subjective:    Patient ID:  Rigo Garcia is a 58 y.o. male     HPI  here for follow-up after recent coronary angiography on June 1, 2018.    I still get a little angina, but not enough to take nitroglycerin.  I am working on reducing my cigarette smoking, down to between 6 and 10 cigarettes a day.    Current Outpatient Prescriptions   Medication Sig    aspirin 325 MG tablet Take 1 tablet (325 mg total) by mouth once daily.    clopidogrel (PLAVIX) 75 mg tablet TAKE ONE TABLET BY MOUTH DAILY    FOLBIC 2.5-25-2 mg Tab TAKE ONE TABLET BY MOUTH DAILY    glucosamine-chondroitin 500-400 mg tablet Take 1 tablet by mouth 3 (three) times daily.    hydroCHLOROthiazide (HYDRODIURIL) 25 MG tablet TAKE ONE TABLET BY MOUTH EVERY DAY    metoprolol succinate (TOPROL-XL) 50 MG 24 hr tablet TAKE ONE TABLET BY MOUTH TWICE DAILY    multivit-mins/iron/folic/lycop (CENTRUM MEN ORAL) Take 1 tablet by mouth once daily.    NIFEdipine (PROCARDIA-XL) 30 MG (OSM) 24 hr tablet TAKE ONE TABLET BY MOUTH TWICE DAILY    nitroGLYCERIN (NITROSTAT) 0.4 MG SL tablet TAKE 1 UNDER THE TONGUE EVERY 5 MINUTES FOR 3 DOSES AS NEEDED FOR CHEST PAIN/IF NO RELIEF CALL ER    pantoprazole (PROTONIX) 40 MG tablet Take 1 tablet (40 mg total) by mouth once daily.    rosuvastatin (CRESTOR) 20 MG tablet Take 1 tablet (20 mg total) by mouth once daily.     No current facility-administered medications for this visit.          Review of Systems   Constitution: Negative for chills, decreased appetite, fever, weight gain and weight loss.   HENT: Negative for congestion, hearing loss and sore throat.    Eyes: Negative for blurred vision, double vision and visual disturbance.   Cardiovascular: Positive for chest pain. Negative for claudication, dyspnea on exertion, leg swelling, palpitations and syncope.   Respiratory: Negative for cough, hemoptysis, shortness of breath, sputum production and wheezing.    Endocrine: Negative for cold intolerance and heat  "intolerance.   Hematologic/Lymphatic: Negative for bleeding problem. Does not bruise/bleed easily.   Skin: Negative for color change, dry skin, flushing and itching.   Musculoskeletal: Negative for back pain, joint pain and myalgias.   Gastrointestinal: Negative for abdominal pain, anorexia, constipation, diarrhea, dysphagia, nausea and vomiting.        No bleeding per rectum   Genitourinary: Negative for dysuria, flank pain, frequency, hematuria and nocturia.   Neurological: Negative for dizziness, headaches, light-headedness, loss of balance, seizures and tremors.   Psychiatric/Behavioral: Negative for altered mental status and depression.         Vitals:    07/03/18 1542   BP: 123/69   Pulse: 89   Weight: 97.1 kg (214 lb)   Height: 5' 9" (1.753 m)     Objective:    Physical Exam   Constitutional: He is oriented to person, place, and time. He appears well-developed and well-nourished.   HENT:   Head: Normocephalic and atraumatic.   Right Ear: External ear normal.   Left Ear: External ear normal.   Nose: Nose normal.   Eyes: Conjunctivae and EOM are normal. Pupils are equal, round, and reactive to light. No scleral icterus.   Neck: Normal range of motion. Neck supple. No JVD present. No tracheal deviation present. No thyromegaly present.   Cardiovascular: Normal rate, regular rhythm and normal heart sounds.  Exam reveals no gallop and no friction rub.    No murmur heard.  Pulmonary/Chest: Effort normal and breath sounds normal. No respiratory distress. He has no rales. He exhibits no tenderness.   Abdominal: Soft. Bowel sounds are normal. He exhibits no distension and no mass. There is no tenderness.   Musculoskeletal: Normal range of motion. He exhibits no edema or tenderness.   Lymphadenopathy:     He has no cervical adenopathy.   Neurological: He is alert and oriented to person, place, and time. He has normal reflexes. No cranial nerve deficit. Coordination normal.   Skin: Skin is warm and dry. No rash noted. "   Psychiatric: He has a normal mood and affect. His behavior is normal.         Assessment:       1. Angina pectoris    2. Atherosclerosis of native coronary artery of native heart with angina pectoris    3. Hx of CABG    4. Essential hypertension    5. Hyperlipidemia, unspecified hyperlipidemia type    6. Current smoker         Plan:       Continue the same medical regimen  Again counseled regarding smoking cessation.

## 2022-10-31 ENCOUNTER — OFFICE VISIT (OUTPATIENT)
Dept: FAMILY MEDICINE | Facility: CLINIC | Age: 62
End: 2022-10-31
Payer: COMMERCIAL

## 2022-10-31 VITALS
DIASTOLIC BLOOD PRESSURE: 74 MMHG | RESPIRATION RATE: 20 BRPM | SYSTOLIC BLOOD PRESSURE: 112 MMHG | BODY MASS INDEX: 33.33 KG/M2 | HEIGHT: 69 IN | HEART RATE: 85 BPM | OXYGEN SATURATION: 97 % | TEMPERATURE: 98 F | WEIGHT: 225 LBS

## 2022-10-31 DIAGNOSIS — J44.0 COPD WITH ACUTE LOWER RESPIRATORY INFECTION: Primary | ICD-10-CM

## 2022-10-31 DIAGNOSIS — R05.9 COUGH: ICD-10-CM

## 2022-10-31 PROBLEM — F17.200 CURRENT SMOKER: Status: RESOLVED | Noted: 2018-04-26 | Resolved: 2022-10-31

## 2022-10-31 PROCEDURE — 99204 OFFICE O/P NEW MOD 45 MIN: CPT | Mod: S$GLB,,, | Performed by: INTERNAL MEDICINE

## 2022-10-31 PROCEDURE — 3078F PR MOST RECENT DIASTOLIC BLOOD PRESSURE < 80 MM HG: ICD-10-PCS | Mod: CPTII,S$GLB,, | Performed by: INTERNAL MEDICINE

## 2022-10-31 PROCEDURE — 1160F PR REVIEW ALL MEDS BY PRESCRIBER/CLIN PHARMACIST DOCUMENTED: ICD-10-PCS | Mod: CPTII,S$GLB,, | Performed by: INTERNAL MEDICINE

## 2022-10-31 PROCEDURE — 3074F SYST BP LT 130 MM HG: CPT | Mod: CPTII,S$GLB,, | Performed by: INTERNAL MEDICINE

## 2022-10-31 PROCEDURE — 3008F PR BODY MASS INDEX (BMI) DOCUMENTED: ICD-10-PCS | Mod: CPTII,S$GLB,, | Performed by: INTERNAL MEDICINE

## 2022-10-31 PROCEDURE — 99204 PR OFFICE/OUTPT VISIT, NEW, LEVL IV, 45-59 MIN: ICD-10-PCS | Mod: S$GLB,,, | Performed by: INTERNAL MEDICINE

## 2022-10-31 PROCEDURE — U0003 INFECTIOUS AGENT DETECTION BY NUCLEIC ACID (DNA OR RNA); SEVERE ACUTE RESPIRATORY SYNDROME CORONAVIRUS 2 (SARS-COV-2) (CORONAVIRUS DISEASE [COVID-19]), AMPLIFIED PROBE TECHNIQUE, MAKING USE OF HIGH THROUGHPUT TECHNOLOGIES AS DESCRIBED BY CMS-2020-01-R: HCPCS | Performed by: INTERNAL MEDICINE

## 2022-10-31 PROCEDURE — 3078F DIAST BP <80 MM HG: CPT | Mod: CPTII,S$GLB,, | Performed by: INTERNAL MEDICINE

## 2022-10-31 PROCEDURE — 99999 PR PBB SHADOW E&M-NEW PATIENT-LVL IV: ICD-10-PCS | Mod: PBBFAC,,, | Performed by: INTERNAL MEDICINE

## 2022-10-31 PROCEDURE — 3074F PR MOST RECENT SYSTOLIC BLOOD PRESSURE < 130 MM HG: ICD-10-PCS | Mod: CPTII,S$GLB,, | Performed by: INTERNAL MEDICINE

## 2022-10-31 PROCEDURE — 3008F BODY MASS INDEX DOCD: CPT | Mod: CPTII,S$GLB,, | Performed by: INTERNAL MEDICINE

## 2022-10-31 PROCEDURE — 1159F MED LIST DOCD IN RCRD: CPT | Mod: CPTII,S$GLB,, | Performed by: INTERNAL MEDICINE

## 2022-10-31 PROCEDURE — U0005 INFEC AGEN DETEC AMPLI PROBE: HCPCS | Performed by: INTERNAL MEDICINE

## 2022-10-31 PROCEDURE — 1160F RVW MEDS BY RX/DR IN RCRD: CPT | Mod: CPTII,S$GLB,, | Performed by: INTERNAL MEDICINE

## 2022-10-31 PROCEDURE — 1159F PR MEDICATION LIST DOCUMENTED IN MEDICAL RECORD: ICD-10-PCS | Mod: CPTII,S$GLB,, | Performed by: INTERNAL MEDICINE

## 2022-10-31 PROCEDURE — 99999 PR PBB SHADOW E&M-NEW PATIENT-LVL IV: CPT | Mod: PBBFAC,,, | Performed by: INTERNAL MEDICINE

## 2022-10-31 RX ORDER — OMEGA-3 FATTY ACIDS 1000 MG
2 CAPSULE ORAL
COMMUNITY

## 2022-10-31 RX ORDER — PROMETHAZINE HYDROCHLORIDE AND DEXTROMETHORPHAN HYDROBROMIDE 6.25; 15 MG/5ML; MG/5ML
5 SYRUP ORAL 2 TIMES DAILY PRN
Qty: 120 ML | Refills: 0 | Status: SHIPPED | OUTPATIENT
Start: 2022-10-31 | End: 2022-11-10

## 2022-10-31 RX ORDER — PREDNISONE 50 MG/1
50 TABLET ORAL DAILY
Qty: 5 TABLET | Refills: 0 | Status: SHIPPED | OUTPATIENT
Start: 2022-10-31 | End: 2022-11-05

## 2022-10-31 RX ORDER — ALIROCUMAB 150 MG/ML
INJECTION, SOLUTION SUBCUTANEOUS
COMMUNITY
Start: 2022-10-14

## 2022-10-31 NOTE — PROGRESS NOTES
SUBJECTIVE     Chief Complaint   Patient presents with    Back Pain    Establish Care       HPI  Rigo Garcia is a 62 y.o. male with multiple medical diagnoses as listed in the medical history and problem list that presents for evaluation of URI x 2 days. Pt reports a productive cough, chest burning, and sore throat. Denies any fever, chills, or night sweats. Pt has not been taking any meds for his symptoms. Denies any recent travel. +sick contacts(granddaughter with URI).    PAST MEDICAL HISTORY:  Past Medical History:   Diagnosis Date    Coronary artery disease     Esophageal spasm     Hypertension     Old MI (myocardial infarction)        PAST SURGICAL HISTORY:  Past Surgical History:   Procedure Laterality Date    Cardiac stents      CARDIAC SURGERY  3/2012    CABG  X  3    CORONARY ARTERY BYPASS GRAFT  2012    triple    Ex. biopsy right neck      LEFT HEART CATHETERIZATION Left 2018    Procedure: CATHETERIZATION, HEART, LEFT LV COR POSS;  Surgeon: Artemio Marcus MD;  Location: [facility] CATH LAB;  Service: Cardiovascular;  Laterality: Left;    TONSILLECTOMY         SOCIAL HISTORY:  Social History     Socioeconomic History    Marital status:    Tobacco Use    Smoking status: Former     Types: Cigarettes     Quit date: 7/15/2015     Years since quittin.3    Smokeless tobacco: Never    Tobacco comments:     6  Black & Mild cigars per day   Substance and Sexual Activity    Alcohol use: Yes     Comment: Occasionally    Drug use: No       FAMILY HISTORY:  Family History   Problem Relation Age of Onset    Heart disease Mother     Diabetes Mother     Heart disease Father     Heart disease Sister     Diabetes Sister     Heart disease Brother     Stroke Maternal Grandmother        ALLERGIES AND MEDICATIONS: updated and reviewed.  Review of patient's allergies indicates:  No Known Allergies  Current Outpatient Medications   Medication Sig Dispense Refill    aspirin 325 MG tablet Take 1 tablet  (325 mg total) by mouth once daily.  0    clopidogrel (PLAVIX) 75 mg tablet TAKE ONE TABLET BY MOUTH EVERY DAY 30 tablet 5    glucosamine-chondroitin 500-400 mg tablet Take 1 tablet by mouth 3 (three) times daily.      hydroCHLOROthiazide (HYDRODIURIL) 25 MG tablet TAKE ONE TABLET BY MOUTH EVERY DAY 30 tablet 3    metoprolol succinate (TOPROL-XL) 50 MG 24 hr tablet TAKE ONE TABLET BY MOUTH TWICE DAILY 60 tablet 3    multivit-mins/iron/folic/lycop (CENTRUM MEN ORAL) Take 1 tablet by mouth once daily.      NIFEdipine (PROCARDIA-XL) 30 MG (OSM) 24 hr tablet TAKE ONE TABLET BY MOUTH TWICE DAILY 60 tablet 2    nitroGLYCERIN (NITROSTAT) 0.4 MG SL tablet TAKE 1 UNDER THE TONGUE EVERY 5 MINUTES FOR 3 DOSES AS NEEDED FOR CHEST PAIN/IF NO RELIEF CALL ER 25 tablet 0    NIVA-FOL 2.5-25-2 mg Tab TAKE ONE TABLET BY MOUTH EVERY DAY 30 tablet 5    pantoprazole (PROTONIX) 40 MG tablet TAKE ONE TABLET BY MOUTH EVERY DAY 30 tablet 11    PRALUENT  mg/mL PnIj SMARTSI Milligram(s) SUB-Q Every 2 Weeks      rosuvastatin (CRESTOR) 20 MG tablet TAKE ONE TABLET BY MOUTH EVERY DAY 30 tablet 6    omega-3 fatty acids 1,000 mg Cap Take 2 g by mouth.      predniSONE (DELTASONE) 50 MG Tab Take 1 tablet (50 mg total) by mouth once daily. for 5 days 5 tablet 0    promethazine-dextromethorphan (PROMETHAZINE-DM) 6.25-15 mg/5 mL Syrp Take 5 mLs by mouth 2 (two) times daily as needed (COUGH). 120 mL 0     No current facility-administered medications for this visit.       ROS  Review of Systems   Constitutional:  Negative for chills and fever.   HENT:  Positive for congestion, rhinorrhea and sore throat. Negative for hearing loss.    Eyes:  Negative for visual disturbance.   Respiratory:  Positive for cough. Negative for shortness of breath.    Cardiovascular:  Negative for chest pain, palpitations and leg swelling.   Gastrointestinal:  Negative for abdominal pain, constipation, diarrhea, nausea and vomiting.   Genitourinary:  Negative for  "dysuria, frequency and urgency.   Musculoskeletal:  Positive for back pain. Negative for arthralgias, joint swelling and myalgias.   Skin:  Negative for rash and wound.   Neurological:  Negative for headaches.   Psychiatric/Behavioral:  Negative for agitation and confusion. The patient is not nervous/anxious.        OBJECTIVE     Physical Exam   Body mass index is 33.23 kg/m².  Weight: 102.1 kg (225 lb)   Height: 5' 9" (175.3 cm)     Physical Exam  Constitutional:       General: He is not in acute distress.     Appearance: He is well-developed.   HENT:      Head: Normocephalic and atraumatic.      Right Ear: External ear normal.      Left Ear: External ear normal.      Nose: Nose normal.   Eyes:      General: No scleral icterus.        Right eye: No discharge.         Left eye: No discharge.      Conjunctiva/sclera: Conjunctivae normal.   Neck:      Vascular: No JVD.      Trachea: No tracheal deviation.   Cardiovascular:      Rate and Rhythm: Normal rate and regular rhythm.      Heart sounds: Normal heart sounds. No murmur heard.    No friction rub. No gallop.   Pulmonary:      Effort: Pulmonary effort is normal. No respiratory distress.      Breath sounds: Normal breath sounds. No wheezing.   Abdominal:      General: Bowel sounds are normal. There is no distension.      Palpations: Abdomen is soft. There is no mass.      Tenderness: There is no abdominal tenderness. There is no guarding or rebound.   Musculoskeletal:         General: No tenderness or deformity. Normal range of motion.      Cervical back: Normal range of motion and neck supple.   Skin:     General: Skin is warm and dry.      Findings: No erythema or rash.   Neurological:      Mental Status: He is alert and oriented to person, place, and time.      Motor: No abnormal muscle tone.      Coordination: Coordination normal.   Psychiatric:         Behavior: Behavior normal.         Thought Content: Thought content normal.         Judgment: Judgment normal. "         Health Maintenance         Date Due Completion Date    Hepatitis C Screening Never done ---    HIV Screening Never done ---    Colorectal Cancer Screening Never done ---    Shingles Vaccine (1 of 2) Never done ---    COVID-19 Vaccine (4 - Booster for Moderna series) 02/15/2022 12/21/2021    Influenza Vaccine (1) 09/01/2022 10/4/2021    Lipid Panel 06/18/2027 6/18/2022    TETANUS VACCINE 10/09/2028 10/9/2018              ASSESSMENT     62 y.o. male with     1. COPD with acute lower respiratory infection    2. Cough        PLAN:     1. COPD with acute lower respiratory infection  - Pt to to take meds through to completion and plan for quarantine until COVID 19 results finalize; he voiced understanding  - predniSONE (DELTASONE) 50 MG Tab; Take 1 tablet (50 mg total) by mouth once daily. for 5 days  Dispense: 5 tablet; Refill: 0  - promethazine-dextromethorphan (PROMETHAZINE-DM) 6.25-15 mg/5 mL Syrp; Take 5 mLs by mouth 2 (two) times daily as needed (COUGH).  Dispense: 120 mL; Refill: 0  - COVID-19 Routine Screening    2. Cough  - COVID19 tested      RTC in 1-2 weeks as needed for any acute worsening of current condition or failure to improve       Anabel Keenan MD  11/01/2022 2:30 PM        No follow-ups on file.

## 2022-11-01 ENCOUNTER — TELEPHONE (OUTPATIENT)
Dept: FAMILY MEDICINE | Facility: CLINIC | Age: 62
End: 2022-11-01
Payer: COMMERCIAL

## 2022-11-01 LAB — SARS-COV-2 RNA RESP QL NAA+PROBE: NOT DETECTED

## 2022-11-30 ENCOUNTER — OFFICE VISIT (OUTPATIENT)
Dept: FAMILY MEDICINE | Facility: CLINIC | Age: 62
End: 2022-11-30
Payer: COMMERCIAL

## 2022-11-30 VITALS
SYSTOLIC BLOOD PRESSURE: 132 MMHG | HEIGHT: 69 IN | BODY MASS INDEX: 34.32 KG/M2 | HEART RATE: 80 BPM | WEIGHT: 231.69 LBS | TEMPERATURE: 98 F | OXYGEN SATURATION: 97 % | DIASTOLIC BLOOD PRESSURE: 80 MMHG

## 2022-11-30 DIAGNOSIS — T14.8XXA HEMATOMA: ICD-10-CM

## 2022-11-30 DIAGNOSIS — Z95.1 HX OF CABG: ICD-10-CM

## 2022-11-30 DIAGNOSIS — I45.2 RBBB (RIGHT BUNDLE BRANCH BLOCK WITH LEFT ANTERIOR FASCICULAR BLOCK): ICD-10-CM

## 2022-11-30 DIAGNOSIS — I25.119 ATHEROSCLEROSIS OF NATIVE CORONARY ARTERY OF NATIVE HEART WITH ANGINA PECTORIS: ICD-10-CM

## 2022-11-30 DIAGNOSIS — E78.00 PURE HYPERCHOLESTEROLEMIA: ICD-10-CM

## 2022-11-30 DIAGNOSIS — I10 ESSENTIAL HYPERTENSION: ICD-10-CM

## 2022-11-30 DIAGNOSIS — D44.12 NEOPLASM OF UNCERTAIN BEHAVIOR OF ADRENAL GLAND, LEFT: ICD-10-CM

## 2022-11-30 DIAGNOSIS — Z00.00 ANNUAL PHYSICAL EXAM: Primary | ICD-10-CM

## 2022-11-30 DIAGNOSIS — I20.9 ANGINA PECTORIS: ICD-10-CM

## 2022-11-30 DIAGNOSIS — I25.10 CORONARY ARTERIOSCLEROSIS IN NATIVE ARTERY: ICD-10-CM

## 2022-11-30 DIAGNOSIS — R53.82 CHRONIC FATIGUE: ICD-10-CM

## 2022-11-30 DIAGNOSIS — Z87.891 EX-SMOKER: ICD-10-CM

## 2022-11-30 DIAGNOSIS — R73.03 PREDIABETES: ICD-10-CM

## 2022-11-30 PROCEDURE — 1159F PR MEDICATION LIST DOCUMENTED IN MEDICAL RECORD: ICD-10-PCS | Mod: CPTII,S$GLB,, | Performed by: FAMILY MEDICINE

## 2022-11-30 PROCEDURE — 99999 PR PBB SHADOW E&M-EST. PATIENT-LVL V: CPT | Mod: PBBFAC,,, | Performed by: FAMILY MEDICINE

## 2022-11-30 PROCEDURE — 1159F MED LIST DOCD IN RCRD: CPT | Mod: CPTII,S$GLB,, | Performed by: FAMILY MEDICINE

## 2022-11-30 PROCEDURE — 3075F SYST BP GE 130 - 139MM HG: CPT | Mod: CPTII,S$GLB,, | Performed by: FAMILY MEDICINE

## 2022-11-30 PROCEDURE — 99386 PREV VISIT NEW AGE 40-64: CPT | Mod: S$GLB,,, | Performed by: FAMILY MEDICINE

## 2022-11-30 PROCEDURE — 3079F DIAST BP 80-89 MM HG: CPT | Mod: CPTII,S$GLB,, | Performed by: FAMILY MEDICINE

## 2022-11-30 PROCEDURE — 99386 PR PREVENTIVE VISIT,NEW,40-64: ICD-10-PCS | Mod: S$GLB,,, | Performed by: FAMILY MEDICINE

## 2022-11-30 PROCEDURE — 3075F PR MOST RECENT SYSTOLIC BLOOD PRESS GE 130-139MM HG: ICD-10-PCS | Mod: CPTII,S$GLB,, | Performed by: FAMILY MEDICINE

## 2022-11-30 PROCEDURE — 99999 PR PBB SHADOW E&M-EST. PATIENT-LVL V: ICD-10-PCS | Mod: PBBFAC,,, | Performed by: FAMILY MEDICINE

## 2022-11-30 PROCEDURE — 3008F PR BODY MASS INDEX (BMI) DOCUMENTED: ICD-10-PCS | Mod: CPTII,S$GLB,, | Performed by: FAMILY MEDICINE

## 2022-11-30 PROCEDURE — 3008F BODY MASS INDEX DOCD: CPT | Mod: CPTII,S$GLB,, | Performed by: FAMILY MEDICINE

## 2022-11-30 PROCEDURE — 3079F PR MOST RECENT DIASTOLIC BLOOD PRESSURE 80-89 MM HG: ICD-10-PCS | Mod: CPTII,S$GLB,, | Performed by: FAMILY MEDICINE

## 2022-11-30 RX ORDER — ERGOCALCIFEROL 1.25 MG/1
50000 CAPSULE ORAL
COMMUNITY
Start: 2022-11-12

## 2022-11-30 RX ORDER — DIFLUPREDNATE OPHTHALMIC 0.5 MG/ML
1 EMULSION OPHTHALMIC 4 TIMES DAILY
COMMUNITY
Start: 2022-11-08

## 2022-11-30 RX ORDER — RANOLAZINE 1000 MG/1
1000 TABLET, EXTENDED RELEASE ORAL 2 TIMES DAILY
COMMUNITY
Start: 2022-11-12

## 2022-11-30 RX ORDER — MOXIFLOXACIN 5 MG/ML
1 SOLUTION/ DROPS OPHTHALMIC 4 TIMES DAILY
COMMUNITY
Start: 2022-11-21

## 2022-11-30 NOTE — PROGRESS NOTES
Chief Complaint   Patient presents with    Establish Care    Back Pain       SUBJECTIVE:   Rigo Garcia is a 62 y.o. male presenting for his annual checkup.   Current Outpatient Medications   Medication Sig Dispense Refill    clopidogrel (PLAVIX) 75 mg tablet TAKE ONE TABLET BY MOUTH EVERY DAY 30 tablet 5    difluprednate (DUREZOL) 0.05 % Drop ophthalmic solution Place 1 drop into the right eye 4 (four) times daily.      ergocalciferol (ERGOCALCIFEROL) 50,000 unit Cap Take 50,000 Units by mouth every 7 days.      glucosamine-chondroitin 500-400 mg tablet Take 1 tablet by mouth 3 (three) times daily.      hydroCHLOROthiazide (HYDRODIURIL) 25 MG tablet TAKE ONE TABLET BY MOUTH EVERY DAY 30 tablet 3    metoprolol succinate (TOPROL-XL) 50 MG 24 hr tablet TAKE ONE TABLET BY MOUTH TWICE DAILY 60 tablet 3    moxifloxacin (VIGAMOX) 0.5 % ophthalmic solution Place 1 drop into the left eye 4 (four) times daily.      multivit-mins/iron/folic/lycop (CENTRUM MEN ORAL) Take 1 tablet by mouth once daily.      NIFEdipine (PROCARDIA-XL) 30 MG (OSM) 24 hr tablet TAKE ONE TABLET BY MOUTH TWICE DAILY 60 tablet 2    nitroGLYCERIN (NITROSTAT) 0.4 MG SL tablet TAKE 1 UNDER THE TONGUE EVERY 5 MINUTES FOR 3 DOSES AS NEEDED FOR CHEST PAIN/IF NO RELIEF CALL ER 25 tablet 0    NIVA-FOL 2.5-25-2 mg Tab TAKE ONE TABLET BY MOUTH EVERY DAY 30 tablet 5    pantoprazole (PROTONIX) 40 MG tablet TAKE ONE TABLET BY MOUTH EVERY DAY 30 tablet 11    PRALUENT  mg/mL PnIj SMARTSI Milligram(s) SUB-Q Every 2 Weeks      ranolazine (RANEXA) 1,000 mg Tb12 Take 1,000 mg by mouth 2 (two) times daily.      rosuvastatin (CRESTOR) 20 MG tablet TAKE ONE TABLET BY MOUTH EVERY DAY 30 tablet 6    aspirin 325 MG tablet Take 1 tablet (325 mg total) by mouth once daily.  0    omega-3 fatty acids 1,000 mg Cap Take 2 g by mouth.       No current facility-administered medications for this visit.     Allergies: Patient has no known allergies.   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 No dyspnea or chest pain on exertion. No abdominal pain, change in bowel habits, black or bloody stools. No urinary tract or prostatic symptoms. No neurological complaints.    OBJECTIVE:   The patient appears well, alert, oriented x 3, in no distress.   /80   Pulse 80   Temp 98 °F (36.7 °C) (Oral)   Ht 5' 9" (1.753 m)   Wt 105.1 kg (231 lb 11.3 oz)   SpO2 97%   BMI 34.22 kg/m²   Wt Readings from Last 5 Encounters:   11/30/22 105.1 kg (231 lb 11.3 oz)   10/31/22 102.1 kg (225 lb)   07/03/18 97.1 kg (214 lb)   06/07/18 95.3 kg (210 lb)   05/31/18 93 kg (205 lb)       ENT abnormal.  Neck with stiffness noted. No adenopathy or thyromegaly. LIAT. Lungs are clear, good air entry, no wheezes, rhonchi or rales. S1 and S2 abnormal, ? murmurs, regular rate and rhythm. Abdomen is soft without tenderness, guarding, mass or organomegaly.  exam: deferred.  Extremities show no edema, abnormal peripheral pulses. Neurological is abnormal without focal findings.  Heart is causing multiple issues     ASSESSMENT:   1. Annual physical exam    2. Atherosclerosis of native coronary artery of native heart with angina pectoris    3. Angina pectoris    4. Hx of CABG    5. Essential hypertension    6. Ex-smoker    7. Pure hypercholesterolemia    8. Coronary arteriosclerosis in native artery    9. Hematoma    10. Chronic fatigue    11. 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 Prediabetes    13. RBBB (right bundle branch block with left anterior fascicular block)          PLAN:   Counseled on age appropriate medical preventative services, including age appropriate cancer screenings, over all nutritional health, need for a consistent exercise regimen and an over all push towards maintaining a vigorous and active lifestyle.  Counseled on age appropriate vaccines and discussed upcoming health care needs based on age/gender.  Spent time with patient counseling on need for a good patient/doctor relationship moving forward.  Discussed use of common OTC medications and supplements.  Discussed common dietary aids and use of caffeine and the need for good sleep hygiene and stress management.    Problem List Items Addressed This Visit       Atherosclerosis of native coronary artery with angina pectoris    Current Assessment & Plan     Very severe in treatment  The current medical regimen is effective;  continue present plan and medications.           Angina pectoris    Current Assessment & Plan     The current medical regimen is effective;  continue present plan and medications.           Hx of CABG    Current Assessment & Plan     Noted  Can't really work now with all of this.  Filed for SSDI         Essential hypertension    Current Assessment & Plan     The current medical regimen is effective;  continue present plan and medications.           Ex-smoker    Current Assessment & Plan     Noted  Still non smoker  Excellent job         Hyperlipidemia    Current Assessment & Plan     The current medical regimen is effective;  continue present plan and medications.           Coronary arteriosclerosis in native artery    Current Assessment & Plan     The current medical regimen is effective;  continue present plan and medications.           Chronic fatigue    Current Assessment & Plan     Multifactorial  But mainly from the heart.  The current medical regimen is effective;  continue present plan  and medications.           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           RBBB (right bundle branch block with left anterior fascicular block)    Current Assessment & Plan     The current medical regimen is effective;  continue present plan and medications.           RESOLVED: Hematoma     Other Visit Diagnoses       Annual physical exam    -  Primary

## 2022-12-01 DIAGNOSIS — I10 ESSENTIAL HYPERTENSION: ICD-10-CM

## 2022-12-01 NOTE — ASSESSMENT & PLAN NOTE
Very severe in treatment  The current medical regimen is effective;  continue present plan and medications.

## 2022-12-01 NOTE — ASSESSMENT & PLAN NOTE
Multifactorial  But mainly from the heart.  The current medical regimen is effective;  continue present plan and medications.

## 2023-09-12 LAB — CRC RECOMMENDATION EXT: NORMAL

## 2023-10-19 ENCOUNTER — TELEPHONE (OUTPATIENT)
Dept: FAMILY MEDICINE | Facility: CLINIC | Age: 63
End: 2023-10-19
Payer: COMMERCIAL

## 2023-10-19 NOTE — TELEPHONE ENCOUNTER
----- Message from Lorraine Kinney sent at 10/19/2023  4:04 PM CDT -----  Regarding: self [phone]  Type:  Sooner Appointment Request    Patient is requesting a sooner appointment.  Patient declined first available appointment listed as well as another facility and provider .  Patient will not accept being placed on the waitlist and is requesting a message be sent to doctor.    Name of Caller: self    When is the first available appointment? //24/24    Symptoms: follow up/ back pain    Would the patient rather a call back or a response via [facility]? Call back     Best Call Back Number: [phone]

## 2023-10-19 NOTE — TELEPHONE ENCOUNTER
Pt requesting to be put on the same weight loss medication as his wife (he does not know the name) but declines first available appointment with you in January. Pt requesting to be seen sooner. Please advise.

## 2023-12-29 ENCOUNTER — PATIENT OUTREACH (OUTPATIENT)
Dept: ADMINISTRATIVE | Facility: HOSPITAL | Age: 63
End: 2023-12-29
Payer: COMMERCIAL

## 2023-12-29 NOTE — PROGRESS NOTES
Health Maintenance Due   Topic Date Due    Hepatitis C Screening  Never done    HIV Screening  Never done    Shingles Vaccine (1 of 2) Never done    RSV Vaccine (Age 60+ and Pregnant patients) (1 - 1-dose 60+ series) Never done    High Dose Statin  01/27/2021    Influenza Vaccine (1) 09/01/2023    COVID-19 Vaccine (4 - 2023-24 season) 09/01/2023   Chart review done. HM updated. Immunizations reviewed & updated. Care Everywhere updated.  [place] [facility] [place]ONOS[place] RESULTS

## 2023-12-29 NOTE — LETTER
AUTHORIZATION FOR RELEASE OF   CONFIDENTIAL INFORMATION    Dear MEDICAL RECORDS,    We are seeing Rigo Garcia, date of birth 1960, in the clinic at [facility] FAMILY MEDICINE/INTERNAL MED. Ulises Madrigal MD is the patient's PCP. Rigo Garcia has an outstanding lab/procedure at the time we reviewed his chart. In order to help keep his health information updated, he has authorized us to request the following medical record(s):        (  )  MAMMOGRAM                                      ( X )  COLONOSCOPY      (  )  PAP SMEAR                                          (  )  OUTSIDE LAB RESULTS     (  )  DEXA SCAN                                          (  )  EYE EXAM            (  )  FOOT EXAM                                          (  )  ENTIRE RECORD     (  )  OUTSIDE IMMUNIZATIONS                 (  )  _______________         Please fax records to [facility], Rodi, Jake J., MD, [phone]      Patient Name: Rigo Garcia  : 1960  Patient Phone #: [phone]

## 2024-01-03 ENCOUNTER — PATIENT OUTREACH (OUTPATIENT)
Dept: ADMINISTRATIVE | Facility: HOSPITAL | Age: 64
End: 2024-01-03
Payer: COMMERCIAL

## 2024-01-03 NOTE — PROGRESS NOTES
Health Maintenance Due   Topic Date Due    Hepatitis C Screening  Never done    HIV Screening  Never done    Shingles Vaccine (1 of 2) Never done    RSV Vaccine (Age 60+ and Pregnant patients) (1 - 1-dose 60+ series) Never done    High Dose Statin  01/27/2021    Influenza Vaccine (1) 09/01/2023    COVID-19 Vaccine (4 - 2023-24 season) 09/01/2023     Chart review done. HM updated. Immunizations reviewed & updated. Care Everywhere updated.

## 2024-01-04 ENCOUNTER — OFFICE VISIT (OUTPATIENT)
Dept: FAMILY MEDICINE | Facility: CLINIC | Age: 64
End: 2024-01-04
Payer: COMMERCIAL

## 2024-01-04 DIAGNOSIS — J44.0 COPD WITH ACUTE LOWER RESPIRATORY INFECTION: Primary | ICD-10-CM

## 2024-01-04 DIAGNOSIS — E66.9 OBESITY, UNSPECIFIED CLASSIFICATION, UNSPECIFIED OBESITY TYPE, UNSPECIFIED WHETHER SERIOUS COMORBIDITY PRESENT: ICD-10-CM

## 2024-01-04 DIAGNOSIS — I20.9 ANGINA PECTORIS: ICD-10-CM

## 2024-01-04 DIAGNOSIS — M54.9 BACK PAIN, UNSPECIFIED BACK LOCATION, UNSPECIFIED BACK PAIN LATERALITY, UNSPECIFIED CHRONICITY: ICD-10-CM

## 2024-01-04 DIAGNOSIS — E27.8 LEFT ADRENAL MASS: ICD-10-CM

## 2024-01-04 DIAGNOSIS — R73.03 PREDIABETES: ICD-10-CM

## 2024-01-04 PROCEDURE — 1160F RVW MEDS BY RX/DR IN RCRD: CPT | Mod: CPTII,95,, | Performed by: FAMILY MEDICINE

## 2024-01-04 PROCEDURE — 1159F MED LIST DOCD IN RCRD: CPT | Mod: CPTII,95,, | Performed by: FAMILY MEDICINE

## 2024-01-04 PROCEDURE — 99213 OFFICE O/P EST LOW 20 MIN: CPT | Mod: 95,,, | Performed by: FAMILY MEDICINE

## 2024-01-04 RX ORDER — TIRZEPATIDE 2.5 MG/.5ML
2.5 INJECTION, SOLUTION SUBCUTANEOUS
Qty: 4 PEN | Refills: 0 | Status: SHIPPED | OUTPATIENT
Start: 2024-01-04

## 2024-01-04 RX ORDER — HYDROCODONE BITARTRATE AND ACETAMINOPHEN 10; 325 MG/1; MG/1
1 TABLET ORAL EVERY 6 HOURS PRN
Qty: 28 TABLET | Refills: 0 | Status: SHIPPED | OUTPATIENT
Start: 2024-01-04 | End: 2024-01-11

## 2024-01-04 RX ORDER — HYDROCODONE BITARTRATE AND ACETAMINOPHEN 10; 325 MG/1; MG/1
1 TABLET ORAL EVERY 6 HOURS PRN
Qty: 28 TABLET | Refills: 0 | Status: SHIPPED | OUTPATIENT
Start: 2024-01-04 | End: 2024-01-04 | Stop reason: SDUPTHER

## 2024-01-04 NOTE — PROGRESS NOTES
Established Patient - Audio Only Telehealth Visit     The patient location is: [place]  The chief complaint leading to consultation is: medication refill  Visit type: Virtual visit with audio only (telephone)  Total time spent with patient: 25       The reason for the audio only service rather than synchronous audio and video virtual visit was related to technical difficulties or patient preference/necessity.     Each patient to whom I provide medical services by telemedicine is:  (1) informed of the relationship between the physician and patient and the respective role of any other health care provider with respect to management of the patient; and (2) notified that they may decline to receive medical services by telemedicine and may withdraw from such care at any time. Patient verbally consented to receive this service via voice-only telephone call.       HPI: needs weight loss shot  Needs rx.   Has serious back pain, wrapping around to the right side and ribs.    Assessment and plan:    1. COPD with acute lower respiratory infection    2. Angina pectoris    3. Left adrenal mass    4. Prediabetes    5. Obesity, unspecified classification, unspecified obesity type, unspecified whether serious comorbidity present      Diagnoses and all orders for this visit:    COPD with acute lower respiratory infection  -     tirzepatide, weight loss, (ZEPBOUND) 2.5 mg/0.5 mL PnIj; Inject 2.5 mg into the skin every 7 days.    Angina pectoris  -     tirzepatide, weight loss, (ZEPBOUND) 2.5 mg/0.5 mL PnIj; Inject 2.5 mg into the skin every 7 days.    Left adrenal mass  -     tirzepatide, weight loss, (ZEPBOUND) 2.5 mg/0.5 mL PnIj; Inject 2.5 mg into the skin every 7 days.    Prediabetes  -     tirzepatide, weight loss, (ZEPBOUND) 2.5 mg/0.5 mL PnIj; Inject 2.5 mg into the skin every 7 days.    Obesity, unspecified classification, unspecified obesity type, unspecified whether serious comorbidity present  -     tirzepatide, weight loss,  (ZEPBOUND) 2.5 mg/0.5 mL PnIj; Inject 2.5 mg into the skin every 7 days.           This service was not originating from a related E/M service provided within the previous 7 days nor will  to an E/M service or procedure within the next 24 hours or my soonest available appointment.  Prevailing standard of care was able to be met in this audio-only visit.

## 2024-01-04 NOTE — ASSESSMENT & PLAN NOTE
Infection is better  Potential medication side effects were discussed with the patient; let me know if any occur.

## 2024-01-11 ENCOUNTER — PATIENT MESSAGE (OUTPATIENT)
Dept: FAMILY MEDICINE | Facility: CLINIC | Age: 64
End: 2024-01-11
Payer: COMMERCIAL

## 2024-01-23 ENCOUNTER — PATIENT MESSAGE (OUTPATIENT)
Dept: FAMILY MEDICINE | Facility: CLINIC | Age: 64
End: 2024-01-23
Payer: COMMERCIAL

## 2024-02-25 ENCOUNTER — PATIENT MESSAGE (OUTPATIENT)
Dept: FAMILY MEDICINE | Facility: CLINIC | Age: 64
End: 2024-02-25
Payer: COMMERCIAL

## 2024-02-25 DIAGNOSIS — M54.9 BACK PAIN, UNSPECIFIED BACK LOCATION, UNSPECIFIED BACK PAIN LATERALITY, UNSPECIFIED CHRONICITY: ICD-10-CM

## 2024-02-26 NOTE — TELEPHONE ENCOUNTER
Last Office Visit Info:   The patient's last visit with Ulises Madrigal MD was on 1/4/2024.    The patient's last visit in current department was on 1/4/2024.

## 2024-02-26 NOTE — TELEPHONE ENCOUNTER
Care Due:                  Date            Visit Type   Department     Provider  --------------------------------------------------------------------------------                                             [facility]      MEDICINE/INTERN  Last Visit: 01-      AUDIO ONLY   [facility]         Ulises Madrigal                              MYCHART      [facility]/OF  MEDICINE/INTERN  Next Visit: 03-      FICE VISIT   [facility]         Ulises Madrigal                                                            Last  Test          Frequency    Reason                     Performed    Due Date  --------------------------------------------------------------------------------    HBA1C.......  6 months...  tirzepatide,.............  Not Found    Overdue    [employer] Embedded Care Due Messages. Reference number: [id number].   2/26/2024 10:07:23 AM CST

## 2024-02-28 RX ORDER — HYDROCODONE BITARTRATE AND ACETAMINOPHEN 10; 325 MG/1; MG/1
1 TABLET ORAL EVERY 6 HOURS PRN
Qty: 28 TABLET | Refills: 0 | OUTPATIENT
Start: 2024-02-28 | End: 2024-03-06

## 2024-03-05 ENCOUNTER — PATIENT MESSAGE (OUTPATIENT)
Dept: ADMINISTRATIVE | Facility: HOSPITAL | Age: 64
End: 2024-03-05
Payer: COMMERCIAL

## 2024-03-05 DIAGNOSIS — M54.9 BACK PAIN, UNSPECIFIED BACK LOCATION, UNSPECIFIED BACK PAIN LATERALITY, UNSPECIFIED CHRONICITY: ICD-10-CM

## 2024-03-08 ENCOUNTER — PATIENT OUTREACH (OUTPATIENT)
Dept: ADMINISTRATIVE | Facility: HOSPITAL | Age: 64
End: 2024-03-08
Payer: COMMERCIAL

## 2024-03-08 ENCOUNTER — TELEPHONE (OUTPATIENT)
Dept: ADMINISTRATIVE | Facility: HOSPITAL | Age: 64
End: 2024-03-08
Payer: COMMERCIAL

## 2024-03-08 VITALS — SYSTOLIC BLOOD PRESSURE: 120 MMHG | DIASTOLIC BLOOD PRESSURE: 80 MMHG

## 2024-03-08 RX ORDER — HYDROCODONE BITARTRATE AND ACETAMINOPHEN 10; 325 MG/1; MG/1
1 TABLET ORAL EVERY 6 HOURS PRN
Qty: 28 TABLET | Refills: 0 | Status: SHIPPED | OUTPATIENT
Start: 2024-03-08 | End: 2024-05-27 | Stop reason: SDUPTHER

## 2024-03-08 NOTE — TELEPHONE ENCOUNTER
Patient reports a home blood pressure reading 120/80. Chart updated.    Below is a message for Dr. Madrigal from the patient.      I slso sent a messsge to Dr. Madrigal regarding phyical therapy with minimal relief. Continued pain issued.   Rigo Garcia      Please discuss with Dr Madrigal if i should return to dr isaac or wait until weight loss. I have apt with dr madrigal on 3/27

## 2024-03-08 NOTE — PROGRESS NOTES
Health Maintenance Due   Topic Date Due    Hepatitis C Screening  Never done    HIV Screening  Never done    Shingles Vaccine (1 of 2) Never done    High Dose Statin  01/27/2021    Aspirin/Antiplatelet Therapy  01/27/2021    Influenza Vaccine (1) 09/01/2023    COVID-19 Vaccine (4 - 2023-24 season) 09/01/2023     Chart review done. HM updated. Immunizations reviewed & updated. Care Everywhere updated.

## 2024-03-08 NOTE — TELEPHONE ENCOUNTER
I would say get in with Orin and get his opinion as well.  Just so we know all the options they are offering  JR

## 2024-03-27 ENCOUNTER — OFFICE VISIT (OUTPATIENT)
Dept: FAMILY MEDICINE | Facility: CLINIC | Age: 64
End: 2024-03-27
Payer: COMMERCIAL

## 2024-03-27 VITALS
BODY MASS INDEX: 34.17 KG/M2 | HEIGHT: 68 IN | SYSTOLIC BLOOD PRESSURE: 122 MMHG | HEART RATE: 67 BPM | DIASTOLIC BLOOD PRESSURE: 80 MMHG | OXYGEN SATURATION: 97 % | TEMPERATURE: 98 F | WEIGHT: 225.5 LBS

## 2024-03-27 DIAGNOSIS — E66.9 OBESITY (BMI 30-39.9): ICD-10-CM

## 2024-03-27 DIAGNOSIS — I10 ESSENTIAL HYPERTENSION: ICD-10-CM

## 2024-03-27 DIAGNOSIS — Z00.00 ANNUAL PHYSICAL EXAM: Primary | ICD-10-CM

## 2024-03-27 DIAGNOSIS — R73.03 PREDIABETES: ICD-10-CM

## 2024-03-27 DIAGNOSIS — M54.9 BACK PAIN, UNSPECIFIED BACK LOCATION, UNSPECIFIED BACK PAIN LATERALITY, UNSPECIFIED CHRONICITY: ICD-10-CM

## 2024-03-27 PROCEDURE — 1159F MED LIST DOCD IN RCRD: CPT | Mod: CPTII,S$GLB,, | Performed by: FAMILY MEDICINE

## 2024-03-27 PROCEDURE — 3074F SYST BP LT 130 MM HG: CPT | Mod: CPTII,S$GLB,, | Performed by: FAMILY MEDICINE

## 2024-03-27 PROCEDURE — 3008F BODY MASS INDEX DOCD: CPT | Mod: CPTII,S$GLB,, | Performed by: FAMILY MEDICINE

## 2024-03-27 PROCEDURE — 99396 PREV VISIT EST AGE 40-64: CPT | Mod: S$GLB,,, | Performed by: FAMILY MEDICINE

## 2024-03-27 PROCEDURE — 1160F RVW MEDS BY RX/DR IN RCRD: CPT | Mod: CPTII,S$GLB,, | Performed by: FAMILY MEDICINE

## 2024-03-27 PROCEDURE — 99999 PR PBB SHADOW E&M-EST. PATIENT-LVL III: CPT | Mod: PBBFAC,,, | Performed by: FAMILY MEDICINE

## 2024-03-27 PROCEDURE — 3079F DIAST BP 80-89 MM HG: CPT | Mod: CPTII,S$GLB,, | Performed by: FAMILY MEDICINE

## 2024-03-27 NOTE — PROGRESS NOTES
No chief complaint on file.      SUBJECTIVE:   Rigo Garcia is a 63 y.o. male presenting for his annual checkup.   Current Outpatient Medications   Medication Sig Dispense Refill    aspirin 325 MG tablet Take 1 tablet (325 mg total) by mouth once daily.  0    clopidogrel (PLAVIX) 75 mg tablet TAKE ONE TABLET BY MOUTH EVERY DAY 30 tablet 5    difluprednate (DUREZOL) 0.05 % Drop ophthalmic solution Place 1 drop into the right eye 4 (four) times daily.      ergocalciferol (ERGOCALCIFEROL) 50,000 unit Cap Take 50,000 Units by mouth every 7 days.      glucosamine-chondroitin 500-400 mg tablet Take 1 tablet by mouth 3 (three) times daily.      hydroCHLOROthiazide (HYDRODIURIL) 25 MG tablet TAKE ONE TABLET BY MOUTH EVERY DAY 30 tablet 3    metoprolol succinate (TOPROL-XL) 50 MG 24 hr tablet TAKE ONE TABLET BY MOUTH TWICE DAILY 60 tablet 3    moxifloxacin (VIGAMOX) 0.5 % ophthalmic solution Place 1 drop into the left eye 4 (four) times daily.      multivit-mins/iron/folic/lycop (CENTRUM MEN ORAL) Take 1 tablet by mouth once daily.      NIFEdipine (PROCARDIA-XL) 30 MG (OSM) 24 hr tablet TAKE ONE TABLET BY MOUTH TWICE DAILY 60 tablet 2    nitroGLYCERIN (NITROSTAT) 0.4 MG SL tablet TAKE 1 UNDER THE TONGUE EVERY 5 MINUTES FOR 3 DOSES AS NEEDED FOR CHEST PAIN/IF NO RELIEF CALL ER 25 tablet 0    NIVA-FOL 2.5-25-2 mg Tab TAKE ONE TABLET BY MOUTH EVERY DAY 30 tablet 5    omega-3 fatty acids 1,000 mg Cap Take 2 g by mouth.      pantoprazole (PROTONIX) 40 MG tablet TAKE ONE TABLET BY MOUTH EVERY DAY 30 tablet 11    PRALUENT  mg/mL PnIj SMARTSI Milligram(s) SUB-Q Every 2 Weeks      ranolazine (RANEXA) 1,000 mg Tb12 Take 1,000 mg by mouth 2 (two) times daily.      rosuvastatin (CRESTOR) 20 MG tablet TAKE ONE TABLET BY MOUTH EVERY DAY 30 tablet 6    semaglutide (OZEMPIC) 2 mg/dose (8 mg/3 mL) PnIj Inject 2 mg into the skin every 7 days. [facility] PHARMAC[facility] COMPOUNDED VERSION 3 mL 11    tirzepatide, weight loss,  "(ZEPBOUND) 2.5 mg/0.5 mL PnIj Inject 2.5 mg into the skin every 7 days. 4 Pen 0     No current facility-administered medications for this visit.     Allergies: Patient has no known allergies.   Patient Active Problem List    Diagnosis Date Noted    COPD with acute lower respiratory infection 01/04/2024    Left adrenal mass 07/26/2022    Prediabetes 07/26/2022    Chronic fatigue 05/04/2022    RBBB (right bundle branch block with left anterior fascicular block) 05/04/2022    Hx of CABG 11/21/2016    Essential hypertension 11/21/2016    Ex-smoker 11/21/2016    Hyperlipidemia 11/21/2016    Angina pectoris 09/03/2015    Atherosclerosis of native coronary artery with angina pectoris 07/22/2015    Coronary arteriosclerosis in native artery 07/22/2015       ROS:  Feeling well. No dyspnea or chest pain on exertion. No abdominal pain, change in bowel habits, black or bloody stools. No urinary tract or prostatic symptoms. No neurological complaints.  Back pain maybe slightly improved  OBJECTIVE:   The patient appears well, alert, oriented x 3, in no distress.   /80   Pulse 67   Temp 98.4 °F (36.9 °C)   Ht 5' 8.25" (1.734 m)   Wt 102.3 kg (225 lb 8.5 oz)   SpO2 97%   BMI 34.04 kg/m²   Wt Readings from Last 5 Encounters:   03/27/24 102.3 kg (225 lb 8.5 oz)   11/30/22 105.1 kg (231 lb 11.3 oz)   10/31/22 102.1 kg (225 lb)   07/03/18 97.1 kg (214 lb)   06/07/18 95.3 kg (210 lb)       ENT normal.  Neck supple. No adenopathy or thyromegaly. LIAT. Lungs are clear, good air entry, no wheezes, rhonchi or rales. S1 and S2 normal, no murmurs, regular rate and rhythm. Abdomen is soft without tenderness, guarding, mass or organomegaly.  exam: deferred.  Extremities show no edema, normal peripheral pulses. Neurological is normal without focal findings. Chronic spinal changes noted.    ASSESSMENT:   1. Annual physical exam    2. Essential hypertension    3. Prediabetes    4. Obesity (BMI 30-39.9)    5. Back pain, unspecified " back location, unspecified back pain laterality, unspecified chronicity          PLAN:   Counseled on age appropriate medical preventative services, including age appropriate cancer screenings, over all nutritional health, need for a consistent exercise regimen and an over all push towards maintaining a vigorous and active lifestyle.  Counseled on age appropriate vaccines and discussed upcoming health care needs based on age/gender.  Spent time with patient counseling on need for a good patient/doctor relationship moving forward.  Discussed use of common OTC medications and supplements.  Discussed common dietary aids and use of caffeine and the need for good sleep hygiene and stress management.    Problem List Items Addressed This Visit       Essential hypertension    Relevant Medications    semaglutide (OZEMPIC) 2 mg/dose (8 mg/3 mL) PnIj    Prediabetes    Relevant Medications    semaglutide (OZEMPIC) 2 mg/dose (8 mg/3 mL) PnIj     Other Visit Diagnoses       Annual physical exam    -  Primary    Obesity (BMI 30-39.9)        Relevant Medications    semaglutide (OZEMPIC) 2 mg/dose (8 mg/3 mL) PnIj    Back pain, unspecified back location, unspecified back pain laterality, unspecified chronicity            Continue glp1RA and cardiovascular  Still has some hydrocodone

## 2024-05-27 ENCOUNTER — OFFICE VISIT (OUTPATIENT)
Dept: FAMILY MEDICINE | Facility: CLINIC | Age: 64
End: 2024-05-27
Payer: MEDICARE

## 2024-05-27 VITALS
OXYGEN SATURATION: 97 % | HEART RATE: 78 BPM | DIASTOLIC BLOOD PRESSURE: 80 MMHG | WEIGHT: 221.31 LBS | SYSTOLIC BLOOD PRESSURE: 124 MMHG | HEIGHT: 69 IN | TEMPERATURE: 98 F | BODY MASS INDEX: 32.78 KG/M2

## 2024-05-27 DIAGNOSIS — I70.0 AORTIC ATHEROSCLEROSIS: ICD-10-CM

## 2024-05-27 DIAGNOSIS — Z13.6 SCREENING FOR AAA (ABDOMINAL AORTIC ANEURYSM): ICD-10-CM

## 2024-05-27 DIAGNOSIS — M54.9 BACK PAIN, UNSPECIFIED BACK LOCATION, UNSPECIFIED BACK PAIN LATERALITY, UNSPECIFIED CHRONICITY: Primary | ICD-10-CM

## 2024-05-27 PROCEDURE — 1159F MED LIST DOCD IN RCRD: CPT | Mod: CPTII,S$GLB,, | Performed by: FAMILY MEDICINE

## 2024-05-27 PROCEDURE — 1101F PT FALLS ASSESS-DOCD LE1/YR: CPT | Mod: CPTII,S$GLB,, | Performed by: FAMILY MEDICINE

## 2024-05-27 PROCEDURE — 99214 OFFICE O/P EST MOD 30 MIN: CPT | Mod: S$GLB,,, | Performed by: FAMILY MEDICINE

## 2024-05-27 PROCEDURE — 99999 PR PBB SHADOW E&M-EST. PATIENT-LVL IV: CPT | Mod: PBBFAC,,, | Performed by: FAMILY MEDICINE

## 2024-05-27 PROCEDURE — 3079F DIAST BP 80-89 MM HG: CPT | Mod: CPTII,S$GLB,, | Performed by: FAMILY MEDICINE

## 2024-05-27 PROCEDURE — 3074F SYST BP LT 130 MM HG: CPT | Mod: CPTII,S$GLB,, | Performed by: FAMILY MEDICINE

## 2024-05-27 PROCEDURE — 3288F FALL RISK ASSESSMENT DOCD: CPT | Mod: CPTII,S$GLB,, | Performed by: FAMILY MEDICINE

## 2024-05-27 PROCEDURE — G2211 COMPLEX E/M VISIT ADD ON: HCPCS | Mod: S$GLB,,, | Performed by: FAMILY MEDICINE

## 2024-05-27 PROCEDURE — 3008F BODY MASS INDEX DOCD: CPT | Mod: CPTII,S$GLB,, | Performed by: FAMILY MEDICINE

## 2024-05-27 RX ORDER — ESOMEPRAZOLE MAGNESIUM 40 MG/1
40 CAPSULE, DELAYED RELEASE ORAL EVERY MORNING
COMMUNITY

## 2024-05-27 RX ORDER — HYDROCODONE BITARTRATE AND ACETAMINOPHEN 10; 325 MG/1; MG/1
1 TABLET ORAL EVERY 6 HOURS PRN
Qty: 28 TABLET | Refills: 0 | Status: SHIPPED | OUTPATIENT
Start: 2024-05-27 | End: 2024-06-03

## 2024-05-27 NOTE — PROGRESS NOTES
"  HISTORY OF PRESENT ILLNESS:  Rigo Garcia is a 64 y.o. male who presents to the clinic today for Follow-up  .     Stable and hanging in there  Hydrocodone PRN  Helps  He is functional and doing well  Has cardiac risk factors that are severe  Taking his medicine compliantly  No new symptoms.      Patient Active Problem List   Diagnosis    Atherosclerosis of native coronary artery with angina pectoris    Angina pectoris    Hx of CABG    Essential hypertension    Ex-smoker    Hyperlipidemia    Coronary arteriosclerosis in native artery    Chronic fatigue    Left adrenal mass    Prediabetes    RBBB (right bundle branch block with left anterior fascicular block)    COPD with acute lower respiratory infection    Aortic atherosclerosis           CARE TEAM:  Patient Care Team:  Ulises Madrigal MD as PCP - General (Family Medicine)  Kirt Ibarra MD as Consulting Physician (Urology)  Bonny Chow MD as Consulting Physician (Endocrinology)  Ike Randall MD as Consulting Physician (Cardiology)  Sonya Chang LPN as Care Coordinator         ROS        PHYSICAL EXAM:  /80   Pulse 78   Temp 98.3 °F (36.8 °C) (Oral)   Ht 5' 9" (1.753 m)   Wt 100.4 kg (221 lb 5.5 oz)   SpO2 97%   BMI 32.69 kg/m²   Wt Readings from Last 5 Encounters:   05/27/24 100.4 kg (221 lb 5.5 oz)   03/27/24 102.3 kg (225 lb 8.5 oz)   11/30/22 105.1 kg (231 lb 11.3 oz)   10/31/22 102.1 kg (225 lb)   07/03/18 97.1 kg (214 lb)     BP Readings from Last 5 Encounters:   05/27/24 124/80   03/27/24 122/80   03/08/24 120/80   11/30/22 132/80   10/31/22 112/74           He appears well, in no apparent distress.  Alert and oriented times three, pleasant and cooperative. Vital signs are as documented in vital signs section.  S1 and S2 normal, no murmurs, clicks, gallops or rubs. Regular rate and rhythm. Chest is clear; no wheezes or rales. No edema or JVD.  Chronic back and joint changes.      Has antalgic gait    Medication List with " Changes/Refills   Current Medications    ASPIRIN 325 MG TABLET    Take 1 tablet (325 mg total) by mouth once daily.    CLOPIDOGREL (PLAVIX) 75 MG TABLET    TAKE ONE TABLET BY MOUTH EVERY DAY    ERGOCALCIFEROL (ERGOCALCIFEROL) 50,000 UNIT CAP    Take 50,000 Units by mouth every 7 days.    ESOMEPRAZOLE (NEXIUM) 40 MG CAPSULE    Take 40 mg by mouth every morning.    GLUCOSAMINE-CHONDROITIN 500-400 MG TABLET    Take 1 tablet by mouth 3 (three) times daily.    HYDROCHLOROTHIAZIDE (HYDRODIURIL) 25 MG TABLET    TAKE ONE TABLET BY MOUTH EVERY DAY    METOPROLOL SUCCINATE (TOPROL-XL) 50 MG 24 HR TABLET    TAKE ONE TABLET BY MOUTH TWICE DAILY    MULTIVIT-MINS/IRON/FOLIC/LYCOP (CENTRUM MEN ORAL)    Take 1 tablet by mouth once daily.    NIFEDIPINE (PROCARDIA-XL) 30 MG (OSM) 24 HR TABLET    TAKE ONE TABLET BY MOUTH TWICE DAILY    NITROGLYCERIN (NITROSTAT) 0.4 MG SL TABLET    TAKE 1 UNDER THE TONGUE EVERY 5 MINUTES FOR 3 DOSES AS NEEDED FOR CHEST PAIN/IF NO RELIEF CALL ER    NIVA-FOL 2.5-25-2 MG TAB    TAKE ONE TABLET BY MOUTH EVERY DAY    PRALUENT  MG/ML PNIJ    SMARTSI Milligram(s) SUB-Q Every 2 Weeks    RANOLAZINE (RANEXA) 1,000 MG TB12    Take 1,000 mg by mouth 2 (two) times daily.    ROSUVASTATIN (CRESTOR) 20 MG TABLET    TAKE ONE TABLET BY MOUTH EVERY DAY    SEMAGLUTIDE (OZEMPIC) 2 MG/DOSE (8 MG/3 ML) PNIJ    Inject 2 mg into the skin every 7 days. [facility] COMPOUNDED VERSION   Changed and/or Refilled Medications    Modified Medication Previous Medication    HYDROCODONE-ACETAMINOPHEN (NORCO)  MG PER TABLET HYDROcodone-acetaminophen (NORCO)  mg per tablet       Take 1 tablet by mouth every 6 (six) hours as needed for Pain.    Take 1 tablet by mouth every 6 (six) hours as needed for Pain.   Discontinued Medications    DIFLUPREDNATE (DUREZOL) 0.05 % DROP OPHTHALMIC SOLUTION    Place 1 drop into the right eye 4 (four) times daily.    MOXIFLOXACIN (VIGAMOX) 0.5 % OPHTHALMIC SOLUTION     Place 1 drop into the left eye 4 (four) times daily.    OMEGA-3 FATTY ACIDS 1,000 MG CAP    Take 2 g by mouth.    PANTOPRAZOLE (PROTONIX) 40 MG TABLET    TAKE ONE TABLET BY MOUTH EVERY DAY    TIRZEPATIDE, WEIGHT LOSS, (ZEPBOUND) 2.5 MG/0.5 ML PNIJ    Inject 2.5 mg into the skin every 7 days.       ASSESSMENT AND PLAN:    Problem List Items Addressed This Visit       Aortic atherosclerosis    Current Assessment & Plan     Continue high dose crestor  Risk modification          Other Visit Diagnoses       Back pain, unspecified back location, unspecified back pain laterality, unspecified chronicity    -  Primary    Relevant Medications    HYDROcodone-acetaminophen (NORCO)  mg per tablet    Screening for AAA (abdominal aortic aneurysm)        Relevant Orders    US AAA Screening          Potential medication side effects were discussed with the patient; let me know if any occur.    Future Appointments   Date Time Provider Department Center   8/26/2024 10:40 AM Ulises Madrigal MD [facility]       Follow up in about 3 months (around 8/27/2024) for assess treatment plan. or sooner as needed.

## 2024-05-28 DIAGNOSIS — Z00.00 ENCOUNTER FOR MEDICARE ANNUAL WELLNESS EXAM: ICD-10-CM

## 2024-05-29 PROBLEM — I70.0 AORTIC ATHEROSCLEROSIS: Status: ACTIVE | Noted: 2024-05-29

## 2024-08-09 ENCOUNTER — PATIENT MESSAGE (OUTPATIENT)
Dept: FAMILY MEDICINE | Facility: CLINIC | Age: 64
End: 2024-08-09
Payer: MEDICARE

## 2024-08-26 ENCOUNTER — OFFICE VISIT (OUTPATIENT)
Dept: FAMILY MEDICINE | Facility: CLINIC | Age: 64
End: 2024-08-26
Payer: MEDICARE

## 2024-08-26 VITALS
TEMPERATURE: 99 F | SYSTOLIC BLOOD PRESSURE: 124 MMHG | BODY MASS INDEX: 32.3 KG/M2 | DIASTOLIC BLOOD PRESSURE: 76 MMHG | HEIGHT: 69 IN | OXYGEN SATURATION: 95 % | WEIGHT: 218.06 LBS | HEART RATE: 82 BPM

## 2024-08-26 DIAGNOSIS — M54.9 BACK PAIN, UNSPECIFIED BACK LOCATION, UNSPECIFIED BACK PAIN LATERALITY, UNSPECIFIED CHRONICITY: ICD-10-CM

## 2024-08-26 PROCEDURE — 1101F PT FALLS ASSESS-DOCD LE1/YR: CPT | Mod: CPTII,S$GLB,, | Performed by: FAMILY MEDICINE

## 2024-08-26 PROCEDURE — 3078F DIAST BP <80 MM HG: CPT | Mod: CPTII,S$GLB,, | Performed by: FAMILY MEDICINE

## 2024-08-26 PROCEDURE — 3008F BODY MASS INDEX DOCD: CPT | Mod: CPTII,S$GLB,, | Performed by: FAMILY MEDICINE

## 2024-08-26 PROCEDURE — 1159F MED LIST DOCD IN RCRD: CPT | Mod: CPTII,S$GLB,, | Performed by: FAMILY MEDICINE

## 2024-08-26 PROCEDURE — 3074F SYST BP LT 130 MM HG: CPT | Mod: CPTII,S$GLB,, | Performed by: FAMILY MEDICINE

## 2024-08-26 PROCEDURE — 99999 PR PBB SHADOW E&M-EST. PATIENT-LVL IV: CPT | Mod: PBBFAC,,, | Performed by: FAMILY MEDICINE

## 2024-08-26 PROCEDURE — 3288F FALL RISK ASSESSMENT DOCD: CPT | Mod: CPTII,S$GLB,, | Performed by: FAMILY MEDICINE

## 2024-08-26 PROCEDURE — 99214 OFFICE O/P EST MOD 30 MIN: CPT | Mod: S$GLB,,, | Performed by: FAMILY MEDICINE

## 2024-08-26 PROCEDURE — G2211 COMPLEX E/M VISIT ADD ON: HCPCS | Mod: S$GLB,,, | Performed by: FAMILY MEDICINE

## 2024-08-26 RX ORDER — HYDROCODONE BITARTRATE AND ACETAMINOPHEN 10; 325 MG/1; MG/1
1 TABLET ORAL EVERY 6 HOURS PRN
Qty: 28 TABLET | Refills: 0 | Status: SHIPPED | OUTPATIENT
Start: 2024-10-07 | End: 2024-10-14

## 2024-08-26 RX ORDER — HYDROCODONE BITARTRATE AND ACETAMINOPHEN 10; 325 MG/1; MG/1
1 TABLET ORAL EVERY 6 HOURS PRN
Qty: 28 TABLET | Refills: 0 | Status: SHIPPED | OUTPATIENT
Start: 2024-09-16 | End: 2024-09-23

## 2024-08-26 RX ORDER — HYDROCODONE BITARTRATE AND ACETAMINOPHEN 10; 325 MG/1; MG/1
1 TABLET ORAL EVERY 6 HOURS PRN
Qty: 28 TABLET | Refills: 0 | Status: SHIPPED | OUTPATIENT
Start: 2024-08-26 | End: 2024-09-02

## 2024-08-26 RX ORDER — HYDROCODONE BITARTRATE AND ACETAMINOPHEN 10; 325 MG/1; MG/1
1 TABLET ORAL EVERY 6 HOURS PRN
Qty: 28 TABLET | Refills: 0 | Status: SHIPPED | OUTPATIENT
Start: 2024-10-28 | End: 2024-11-04

## 2024-08-26 NOTE — PROGRESS NOTES
Chief Complaint   Patient presents with    Follow-up       SUBJECTIVE:  Rigo Garcia is a 64 y.o. male presents with   Chief Complaint   Patient presents with    Follow-up     Conservative treatment with tylenol, NSAIDs, alternative treatments, complementary non opioid nerve/epileptic medication has failed with primary use.  Now opioid dependent for relief.  Has tried PT/OT, injections with mixed success.      Past Medical History:   Diagnosis Date    Coronary artery disease     Esophageal spasm     Hypertension     Old MI (myocardial infarction)      Past Surgical History:   Procedure Laterality Date    Cardiac stents      CARDIAC SURGERY  3/2012    CABG  X  3    CORONARY ARTERY BYPASS GRAFT  2012    triple    Ex. biopsy right neck      LEFT HEART CATHETERIZATION Left 2018    Procedure: CATHETERIZATION, HEART, LEFT LV COR POSS;  Surgeon: Artemio Marcus MD;  Location: [facility] CATH LAB;  Service: Cardiovascular;  Laterality: Left;    TONSILLECTOMY       Social History     Socioeconomic History    Marital status:    Tobacco Use    Smoking status: Former     Current packs/day: 0.00     Types: Cigarettes     Quit date: 7/15/2015     Years since quittin.1    Smokeless tobacco: Never    Tobacco comments:     6  Black & Mild cigars per day   Substance and Sexual Activity    Alcohol use: Yes     Comment: Occasionally    Drug use: No     Social Determinants of Health     Financial Resource Strain: Low Risk  (2024)    Received from [facility]    Overall Financial Resource Strain (CARDIA)     Difficulty of Paying Living Expenses: Not hard at all   Food Insecurity: No Food Insecurity (2024)    Received from [facility]    Hunger Vital Sign     Worried About Running Out of Food in the Last Year: Never true     Ran Out of Food in the Last Year: Never true   Transportation Needs: No Transportation Needs (2024)    Received from [facility]    PRAPARE - Transportation     Lack of  Transportation (Medical): No     Lack of Transportation (Non-Medical): No   Physical Activity: Inactive (2024)    Received from [facility]    Exercise Vital Sign     Days of Exercise per Week: 0 days     Minutes of Exercise per Session: 0 min   Stress: Stress Concern Present (2023)    Received from [facility], [facility]    [employer] - Occupational Stress Questionnaire     Feeling of Stress : Very much     Family History   Problem Relation Name Age of Onset    Heart disease Mother      Diabetes Mother      Heart disease Father      Heart disease Sister      Diabetes Sister      Heart disease Brother      Stroke Maternal Grandmother       Current Outpatient Medications on File Prior to Visit   Medication Sig Dispense Refill    clopidogrel (PLAVIX) 75 mg tablet TAKE ONE TABLET BY MOUTH EVERY DAY 30 tablet 5    ergocalciferol (ERGOCALCIFEROL) 50,000 unit Cap Take 50,000 Units by mouth every 7 days.      esomeprazole (NEXIUM) 40 MG capsule Take 40 mg by mouth every morning.      glucosamine-chondroitin 500-400 mg tablet Take 1 tablet by mouth 3 (three) times daily.      hydroCHLOROthiazide (HYDRODIURIL) 25 MG tablet TAKE ONE TABLET BY MOUTH EVERY DAY 30 tablet 3    metoprolol succinate (TOPROL-XL) 50 MG 24 hr tablet TAKE ONE TABLET BY MOUTH TWICE DAILY 60 tablet 3    multivit-mins/iron/folic/lycop (CENTRUM MEN ORAL) Take 1 tablet by mouth once daily.      NIFEdipine (PROCARDIA-XL) 30 MG (OSM) 24 hr tablet TAKE ONE TABLET BY MOUTH TWICE DAILY 60 tablet 2    nitroGLYCERIN (NITROSTAT) 0.4 MG SL tablet TAKE 1 UNDER THE TONGUE EVERY 5 MINUTES FOR 3 DOSES AS NEEDED FOR CHEST PAIN/IF NO RELIEF CALL ER 25 tablet 0    NIVA-FOL 2.5-25-2 mg Tab TAKE ONE TABLET BY MOUTH EVERY DAY 30 tablet 5    PRALUENT  mg/mL PnIj SMARTSI Milligram(s) SUB-Q Every 2 Weeks      ranolazine (RANEXA) 1,000 mg Tb12 Take 1,000 mg by mouth 2 (two) times daily.      rosuvastatin (CRESTOR) 20 MG tablet  "TAKE ONE TABLET BY MOUTH EVERY DAY 30 tablet 6    semaglutide (OZEMPIC) 2 mg/dose (8 mg/3 mL) PnIj Inject 2 mg into the skin every 7 days. [facility] PHARMACY COMPOUNDED VERSION 3 mL 11    aspirin 325 MG tablet Take 1 tablet (325 mg total) by mouth once daily.  0    [DISCONTINUED] HYDROcodone-acetaminophen (NORCO)  mg per tablet Take 1 tablet by mouth every 6 (six) hours as needed for Pain. 28 tablet 0     No current facility-administered medications on file prior to visit.     Review of patient's allergies indicates:  No Known Allergies    ROS    OBJECTIVE:  /76   Pulse 82   Temp 98.5 °F (36.9 °C) (Oral)   Ht 5' 9" (1.753 m)   Wt 98.9 kg (218 lb 0.6 oz)   SpO2 95%   BMI 32.20 kg/m²   Wt Readings from Last 5 Encounters:   08/26/24 98.9 kg (218 lb 0.6 oz)   05/27/24 100.4 kg (221 lb 5.5 oz)   03/27/24 102.3 kg (225 lb 8.5 oz)   11/30/22 105.1 kg (231 lb 11.3 oz)   10/31/22 102.1 kg (225 lb)       In usual state of health without signs of drug misuse/abuse today.  Specifically no alteration in cognition, signs of injections into the skin.  Patient areas of chronic pain in the back and multiple joints   No new focal neurological findings noted.    Chronic aids to ambulation    Reviewed  and NARx: reviewed    Assessment/Plan:    1. Back pain, unspecified back location, unspecified back pain laterality, unspecified chronicity        Discussed chronic pain diagnosis again, reviewed pain contract and patient is still in agreement with it. Continued to stress the importance of smoking cessation/avoiding tobacco products which can exacerbate the pain.  Continued to discuss the importance of good nutrition and some type of exercise program even if it's very basic and light to help sustain function.  Continued to discuss risks, benefits and alternatives of care with high risk medication and we have decided to continue to use them.  Our goal continues to be stabilizing of function, quality of life and " avoidance of medication side effects.  Patient voiced understanding.    Problem List Items Addressed This Visit    None  Visit Diagnoses       Back pain, unspecified back location, unspecified back pain laterality, unspecified chronicity        Relevant Medications    HYDROcodone-acetaminophen (NORCO)  mg per tablet    HYDROcodone-acetaminophen (NORCO)  mg per tablet (Start on 9/16/2024)    HYDROcodone-acetaminophen (NORCO)  mg per tablet (Start on 10/7/2024)    HYDROcodone-acetaminophen (NORCO)  mg per tablet (Start on 10/28/2024)            Follow up in 3 months

## 2024-10-07 ENCOUNTER — E-VISIT (OUTPATIENT)
Dept: FAMILY MEDICINE | Facility: CLINIC | Age: 64
End: 2024-10-07
Payer: MEDICARE

## 2024-10-07 DIAGNOSIS — R50.9 FEVER, UNSPECIFIED FEVER CAUSE: ICD-10-CM

## 2024-10-07 DIAGNOSIS — R05.9 COUGH, UNSPECIFIED TYPE: Primary | ICD-10-CM

## 2024-10-08 ENCOUNTER — PATIENT MESSAGE (OUTPATIENT)
Dept: FAMILY MEDICINE | Facility: CLINIC | Age: 64
End: 2024-10-08
Payer: MEDICARE

## 2024-10-08 RX ORDER — AZITHROMYCIN 250 MG/1
TABLET, FILM COATED ORAL
Qty: 6 TABLET | Refills: 0 | Status: SHIPPED | OUTPATIENT
Start: 2024-10-08 | End: 2024-10-13

## 2024-10-08 NOTE — TELEPHONE ENCOUNTER
Dr. Madrigal   I stared e visit yesterday evening. It is progress. Wook up with fever of 100.7. Was exposed to grandkids that had pneumonia. I have a cough. Z pack was perdcribded to grandkids, wife and daughter. The fever is a new symptom.      Rigo Garcia

## 2024-10-08 NOTE — PROGRESS NOTES
Patient ID: Rigo Garcia is a 64 y.o. male.    Chief Complaint: General Illness (Entered automatically based on patient selection in [employer].)    The patient initiated a request through [employer] on 10/7/2024 for evaluation and management with a chief complaint of General Illness (Entered automatically based on patient selection in [employer].)     I evaluated the questionnaire submission on 10/8/2024.    [facility] Evisit [facility]-Cough And Cold    10/7/2024  5:25 PM CDT - Filed by Patient   What do you need help with? Cough, Cold, Sore Throat   Do you agree to participate in an E-Visit? Yes   If you have any of the following symptoms, go to your local emergency room or call 911: I acknowledge   What is the main issue you would like addressed today? Family had mycoplasma pneumonia. Now showing symptoms   Do you think you might have COVID or the Flu? No   Have you tested positive for COVID or Flu? No   What symptoms do you currently have?  Cough;  Fatigue;  Nasal Congestion   Describe your cough: Productive (containing mucus);  Bothersome (interferes with daily activities)   Describe the mucus: Clear   Have you ever smoked? I smoked in the past   Have you had a fever? No   When did your symptoms first appear? 10/7/2024   In the last two weeks, have you been in close contact with someone who has COVID-19 or the Flu? No   List what you have done or taken to help your symptoms. Family had mycoplasma pneumonia. Now starting to show symptoms   How severe are your symptoms? Severe   Have your symptoms gotten better or worse since they started?  Worse   Do you have transportation to get testing if it is needed and ordered for you at an [facility] location? Yes   Provide any additional information you feel is important. Showing symptoms of pneumonia   Please attach any relevant images or files    Are you able to take your vital signs? No         Encounter Diagnoses   Name Primary?    Cough, unspecified type Yes    Fever,  unspecified fever cause         No orders of the defined types were placed in this encounter.     Medications Ordered This Encounter   Medications    azithromycin (Z-PREETI) 250 MG tablet     Sig: Take 2 tablets by mouth on day 1; Take 1 tablet by mouth on days 2-5     Dispense:  6 tablet     Refill:  0        No follow-ups on file.      E-Visit Time Tracking:    Day 1 Time (in minutes): 6    Total Time (in minutes): 6

## 2024-12-05 ENCOUNTER — PATIENT MESSAGE (OUTPATIENT)
Dept: FAMILY MEDICINE | Facility: CLINIC | Age: 64
End: 2024-12-05
Payer: MEDICARE

## 2024-12-05 DIAGNOSIS — M54.9 BACK PAIN, UNSPECIFIED BACK LOCATION, UNSPECIFIED BACK PAIN LATERALITY, UNSPECIFIED CHRONICITY: ICD-10-CM

## 2024-12-05 RX ORDER — HYDROCODONE BITARTRATE AND ACETAMINOPHEN 10; 325 MG/1; MG/1
1 TABLET ORAL EVERY 6 HOURS PRN
Qty: 28 TABLET | Refills: 0 | Status: SHIPPED | OUTPATIENT
Start: 2024-12-05 | End: 2024-12-12

## 2024-12-05 NOTE — TELEPHONE ENCOUNTER
Care Due:                  Date            Visit Type   Department     Provider  --------------------------------------------------------------------------------                                [facility] FAMILY                              PRIMARY      MEDICINE/INTERN  Last Visit: 08-      CARE ([facility])   AL MARTIN Madrigal                              [facility]                              PRIMARY      MEDICINE/INTERN  Next Visit: 02-      CARE ([facility])   AL MARTIN Madrigal                                                            Last  Test          Frequency    Reason                     Performed    Due Date  --------------------------------------------------------------------------------    HBA1C.......  6 months...  semaglutide..............  Not Found    Overdue    [employer] Embedded Care Due Messages. Reference number: [id number].   12/05/2024 1:46:27 PM CST

## 2025-01-06 ENCOUNTER — PATIENT MESSAGE (OUTPATIENT)
Dept: FAMILY MEDICINE | Facility: CLINIC | Age: 65
End: 2025-01-06
Payer: MEDICARE

## 2025-01-06 DIAGNOSIS — M54.9 BACK PAIN, UNSPECIFIED BACK LOCATION, UNSPECIFIED BACK PAIN LATERALITY, UNSPECIFIED CHRONICITY: ICD-10-CM

## 2025-01-06 NOTE — TELEPHONE ENCOUNTER
No care due was identified.  [facility] Embedded Care Due Messages. Reference number: [id number].   1/06/2025 4:11:27 PM CST

## 2025-01-07 DIAGNOSIS — M54.9 BACK PAIN, UNSPECIFIED BACK LOCATION, UNSPECIFIED BACK PAIN LATERALITY, UNSPECIFIED CHRONICITY: ICD-10-CM

## 2025-01-07 RX ORDER — HYDROCODONE BITARTRATE AND ACETAMINOPHEN 10; 325 MG/1; MG/1
1 TABLET ORAL EVERY 6 HOURS PRN
Qty: 28 TABLET | Refills: 0 | Status: SHIPPED | OUTPATIENT
Start: 2025-01-07 | End: 2025-01-14

## 2025-01-07 RX ORDER — HYDROCODONE BITARTRATE AND ACETAMINOPHEN 10; 325 MG/1; MG/1
1 TABLET ORAL EVERY 6 HOURS PRN
Qty: 28 TABLET | Refills: 0 | OUTPATIENT
Start: 2025-01-07

## 2025-01-07 NOTE — TELEPHONE ENCOUNTER
No care due was identified.  [facility] Embedded Care Due Messages. Reference number: [id number].   1/07/2025 8:36:20 AM CST

## 2025-02-05 ENCOUNTER — OFFICE VISIT (OUTPATIENT)
Dept: FAMILY MEDICINE | Facility: CLINIC | Age: 65
End: 2025-02-05
Payer: MEDICARE

## 2025-02-05 DIAGNOSIS — J44.0 COPD WITH ACUTE LOWER RESPIRATORY INFECTION: ICD-10-CM

## 2025-02-05 DIAGNOSIS — I10 ESSENTIAL HYPERTENSION: ICD-10-CM

## 2025-02-05 DIAGNOSIS — M54.9 BACK PAIN, UNSPECIFIED BACK LOCATION, UNSPECIFIED BACK PAIN LATERALITY, UNSPECIFIED CHRONICITY: ICD-10-CM

## 2025-02-05 DIAGNOSIS — M13.0 POLYARTHRITIS: Primary | ICD-10-CM

## 2025-02-05 RX ORDER — HYDROCODONE BITARTRATE AND ACETAMINOPHEN 10; 325 MG/1; MG/1
1 TABLET ORAL EVERY 6 HOURS PRN
Qty: 28 TABLET | Refills: 0 | Status: SHIPPED | OUTPATIENT
Start: 2025-02-05 | End: 2025-02-12

## 2025-02-05 RX ORDER — HYDROCODONE BITARTRATE AND ACETAMINOPHEN 10; 325 MG/1; MG/1
1 TABLET ORAL EVERY 6 HOURS PRN
Qty: 28 TABLET | Refills: 0 | Status: SHIPPED | OUTPATIENT
Start: 2025-04-01 | End: 2025-04-08

## 2025-02-05 RX ORDER — HYDROCODONE BITARTRATE AND ACETAMINOPHEN 10; 325 MG/1; MG/1
1 TABLET ORAL EVERY 6 HOURS PRN
Qty: 28 TABLET | Refills: 0 | Status: SHIPPED | OUTPATIENT
Start: 2025-04-28 | End: 2025-05-05

## 2025-02-05 RX ORDER — MELOXICAM 15 MG/1
15 TABLET ORAL DAILY
Qty: 90 TABLET | Refills: 0 | Status: SHIPPED | OUTPATIENT
Start: 2025-02-05

## 2025-02-05 RX ORDER — HYDROCODONE BITARTRATE AND ACETAMINOPHEN 10; 325 MG/1; MG/1
1 TABLET ORAL EVERY 6 HOURS PRN
Qty: 28 TABLET | Refills: 0 | Status: SHIPPED | OUTPATIENT
Start: 2025-03-03 | End: 2025-03-10

## 2025-02-05 NOTE — ASSESSMENT & PLAN NOTE
The current medical regimen is effective;  continue present plan and medications.  Potential medication side effects were discussed with the patient; let me know if any occur.

## 2025-02-05 NOTE — PROGRESS NOTES
HISTORY OF PRESENT ILLNESS:  Rigo Garcia is a 64 y.o. male who presents to the clinic today for Medication Refill  .     The patient is taking hypertensive medications compliantly without side effects.  Denies chest pain, dyspnea, edema, or TIA's.  Still with a lot of pain  Medicine is helping  He is staying active  Copd is stable      Patient Active Problem List   Diagnosis    Atherosclerosis of native coronary artery with angina pectoris    Angina pectoris    Hx of CABG    Essential hypertension    Ex-smoker    Hyperlipidemia    Coronary arteriosclerosis in native artery    Chronic fatigue    Left adrenal mass    Prediabetes    RBBB (right bundle branch block with left anterior fascicular block)    COPD with acute lower respiratory infection    Aortic atherosclerosis           CARE TEAM:  Patient Care Team:  Ulises Madrigal MD as PCP - General (Family Medicine)  Kirt Ibarra MD as Consulting Physician (Urology)  Bonny Chow MD as Consulting Physician (Endocrinology)  Ike Randall MD as Consulting Physician (Cardiology)  Sonya Chang LPN as Care Coordinator         ROS        PHYSICAL EXAM:  There were no vitals taken for this visit.  Wt Readings from Last 5 Encounters:   08/26/24 98.9 kg (218 lb 0.6 oz)   05/27/24 100.4 kg (221 lb 5.5 oz)   03/27/24 102.3 kg (225 lb 8.5 oz)   11/30/22 105.1 kg (231 lb 11.3 oz)   10/31/22 102.1 kg (225 lb)     BP Readings from Last 5 Encounters:   08/26/24 124/76   05/27/24 124/80   03/27/24 122/80   03/08/24 120/80   11/30/22 132/80           He appears well, in no apparent distress.  Alert and oriented times three, pleasant and cooperative. Vital signs are as documented in vital signs section.  S1 and S2 normal, no murmurs, clicks, gallops or rubs. Regular rate and rhythm. Chest is clear; no wheezes or rales. No edema or JVD.  Chronic spinal changes noted.      Medication List with Changes/Refills   New Medications    HYDROCODONE-ACETAMINOPHEN (NORCO)   MG PER TABLET    Take 1 tablet by mouth every 6 (six) hours as needed for Pain.    HYDROCODONE-ACETAMINOPHEN (NORCO)  MG PER TABLET    Take 1 tablet by mouth every 6 (six) hours as needed for Pain.    HYDROCODONE-ACETAMINOPHEN (NORCO)  MG PER TABLET    Take 1 tablet by mouth every 6 (six) hours as needed for Pain.    MELOXICAM (MOBIC) 15 MG TABLET    Take 1 tablet (15 mg total) by mouth once daily.    SEMAGLUTIDE, WEIGHT LOSS, 2.4 MG/0.75 ML PNIJ    COMPOUNDED INJECTION:5mg/mL-3mL (15mg) use as directed maximum 50 units weekly   Current Medications    ASPIRIN 325 MG TABLET    Take 1 tablet (325 mg total) by mouth once daily.    CLOPIDOGREL (PLAVIX) 75 MG TABLET    TAKE ONE TABLET BY MOUTH EVERY DAY    ERGOCALCIFEROL (ERGOCALCIFEROL) 50,000 UNIT CAP    Take 50,000 Units by mouth every 7 days.    ESOMEPRAZOLE (NEXIUM) 40 MG CAPSULE    Take 40 mg by mouth every morning.    GLUCOSAMINE-CHONDROITIN 500-400 MG TABLET    Take 1 tablet by mouth 3 (three) times daily.    HYDROCHLOROTHIAZIDE (HYDRODIURIL) 25 MG TABLET    TAKE ONE TABLET BY MOUTH EVERY DAY    METOPROLOL SUCCINATE (TOPROL-XL) 50 MG 24 HR TABLET    TAKE ONE TABLET BY MOUTH TWICE DAILY    MULTIVIT-MINS/IRON/FOLIC/LYCOP (CENTRUM MEN ORAL)    Take 1 tablet by mouth once daily.    NIFEDIPINE (PROCARDIA-XL) 30 MG (OSM) 24 HR TABLET    TAKE ONE TABLET BY MOUTH TWICE DAILY    NITROGLYCERIN (NITROSTAT) 0.4 MG SL TABLET    TAKE 1 UNDER THE TONGUE EVERY 5 MINUTES FOR 3 DOSES AS NEEDED FOR CHEST PAIN/IF NO RELIEF CALL ER    NIVA-FOL 2.5-25-2 MG TAB    TAKE ONE TABLET BY MOUTH EVERY DAY    PRALUENT  MG/ML PNIJ    SMARTSI Milligram(s) SUB-Q Every 2 Weeks    RANOLAZINE (RANEXA) 1,000 MG TB12    Take 1,000 mg by mouth 2 (two) times daily.    ROSUVASTATIN (CRESTOR) 20 MG TABLET    TAKE ONE TABLET BY MOUTH EVERY DAY    SEMAGLUTIDE (OZEMPIC) 2 MG/DOSE (8 MG/3 ML) PNIJ    Inject 2 mg into the skin every 7 days. [facility] COMPOUNDED  VERSION   Discontinued Medications    HYDROCODONE-ACETAMINOPHEN (NORCO)  MG PER TABLET    Take 1 tablet by mouth every 6 (six) hours as needed for Pain.       ASSESSMENT AND PLAN:    Problem List Items Addressed This Visit       Essential hypertension    Relevant Medications    semaglutide, weight loss, 2.4 mg/0.75 mL PnIj    COPD with acute lower respiratory infection    Current Assessment & Plan   The current medical regimen is effective;  continue present plan and medications.  Potential medication side effects were discussed with the patient; let me know if any occur.           Relevant Medications    semaglutide, weight loss, 2.4 mg/0.75 mL PnIj     Other Visit Diagnoses         Polyarthritis    -  Primary    Relevant Medications    semaglutide, weight loss, 2.4 mg/0.75 mL PnIj    meloxicam (MOBIC) 15 MG tablet      Back pain, unspecified back location, unspecified back pain laterality, unspecified chronicity        Relevant Medications    HYDROcodone-acetaminophen (NORCO)  mg per tablet (Start on 3/3/2025)    HYDROcodone-acetaminophen (NORCO)  mg per tablet (Start on 4/1/2025)    HYDROcodone-acetaminophen (NORCO)  mg per tablet (Start on 4/28/2025)            Future Appointments   Date Time Provider Department Center   5/5/2025  3:20 PM Ulises Madrigal MD [facility]       Follow up in about 3 months (around 5/5/2025) for assess treatment plan, chronic pain managment. or sooner as needed.

## 2025-03-24 DIAGNOSIS — Z00.00 ENCOUNTER FOR MEDICARE ANNUAL WELLNESS EXAM: ICD-10-CM

## 2025-05-05 ENCOUNTER — OFFICE VISIT (OUTPATIENT)
Dept: FAMILY MEDICINE | Facility: CLINIC | Age: 65
End: 2025-05-05
Payer: MEDICARE

## 2025-05-05 VITALS
HEIGHT: 70 IN | DIASTOLIC BLOOD PRESSURE: 60 MMHG | OXYGEN SATURATION: 95 % | BODY MASS INDEX: 29.8 KG/M2 | TEMPERATURE: 99 F | HEART RATE: 82 BPM | WEIGHT: 208.13 LBS | SYSTOLIC BLOOD PRESSURE: 120 MMHG

## 2025-05-05 DIAGNOSIS — Z00.00 ANNUAL PHYSICAL EXAM: Primary | ICD-10-CM

## 2025-05-05 DIAGNOSIS — M54.9 BACK PAIN, UNSPECIFIED BACK LOCATION, UNSPECIFIED BACK PAIN LATERALITY, UNSPECIFIED CHRONICITY: ICD-10-CM

## 2025-05-05 PROCEDURE — 3288F FALL RISK ASSESSMENT DOCD: CPT | Mod: CPTII,S$GLB,, | Performed by: FAMILY MEDICINE

## 2025-05-05 PROCEDURE — 3008F BODY MASS INDEX DOCD: CPT | Mod: CPTII,S$GLB,, | Performed by: FAMILY MEDICINE

## 2025-05-05 PROCEDURE — 99397 PER PM REEVAL EST PAT 65+ YR: CPT | Mod: S$GLB,,, | Performed by: FAMILY MEDICINE

## 2025-05-05 PROCEDURE — 3044F HG A1C LEVEL LT 7.0%: CPT | Mod: CPTII,S$GLB,, | Performed by: FAMILY MEDICINE

## 2025-05-05 PROCEDURE — 1101F PT FALLS ASSESS-DOCD LE1/YR: CPT | Mod: CPTII,S$GLB,, | Performed by: FAMILY MEDICINE

## 2025-05-05 PROCEDURE — 99999 PR PBB SHADOW E&M-EST. PATIENT-LVL III: CPT | Mod: PBBFAC,,, | Performed by: FAMILY MEDICINE

## 2025-05-05 PROCEDURE — 3074F SYST BP LT 130 MM HG: CPT | Mod: CPTII,S$GLB,, | Performed by: FAMILY MEDICINE

## 2025-05-05 PROCEDURE — 1126F AMNT PAIN NOTED NONE PRSNT: CPT | Mod: CPTII,S$GLB,, | Performed by: FAMILY MEDICINE

## 2025-05-05 PROCEDURE — 3078F DIAST BP <80 MM HG: CPT | Mod: CPTII,S$GLB,, | Performed by: FAMILY MEDICINE

## 2025-05-05 RX ORDER — ISOSORBIDE MONONITRATE 30 MG/1
60 TABLET, EXTENDED RELEASE ORAL
COMMUNITY
Start: 2025-03-20

## 2025-05-05 RX ORDER — EVOLOCUMAB 140 MG/ML
INJECTION, SOLUTION SUBCUTANEOUS
COMMUNITY

## 2025-05-05 RX ORDER — HYDROCODONE BITARTRATE AND ACETAMINOPHEN 10; 325 MG/1; MG/1
1 TABLET ORAL EVERY 6 HOURS PRN
Qty: 28 TABLET | Refills: 0 | Status: SHIPPED | OUTPATIENT
Start: 2025-06-24 | End: 2025-07-01

## 2025-05-05 RX ORDER — CYANOCOBALAMIN 1000 UG/ML
1000 INJECTION, SOLUTION INTRAMUSCULAR; SUBCUTANEOUS WEEKLY
Qty: 4 ML | Refills: 1 | Status: SHIPPED | OUTPATIENT
Start: 2025-05-05 | End: 2025-06-02

## 2025-05-05 RX ORDER — HYDROCODONE BITARTRATE AND ACETAMINOPHEN 10; 325 MG/1; MG/1
1 TABLET ORAL EVERY 6 HOURS PRN
Qty: 28 TABLET | Refills: 0 | Status: SHIPPED | OUTPATIENT
Start: 2025-05-26 | End: 2025-06-02

## 2025-05-05 RX ORDER — HYDROCODONE BITARTRATE AND ACETAMINOPHEN 10; 325 MG/1; MG/1
1 TABLET ORAL EVERY 6 HOURS PRN
Qty: 28 TABLET | Refills: 0 | Status: SHIPPED | OUTPATIENT
Start: 2025-07-22 | End: 2025-07-29

## 2025-05-05 NOTE — PROGRESS NOTES
"Chief Complaint   Patient presents with    Annual Exam       SUBJECTIVE:   Rigo Garcia is a 64 y.o. male presenting for his annual checkup.   Current Medications[1]  Allergies: Patient has no known allergies.   Patient Active Problem List    Diagnosis Date Noted    Aortic atherosclerosis 05/29/2024    COPD with acute lower respiratory infection 01/04/2024    Left adrenal mass 07/26/2022    Prediabetes 07/26/2022    Chronic fatigue 05/04/2022    RBBB (right bundle branch block with left anterior fascicular block) 05/04/2022    Hx of CABG 11/21/2016    Essential hypertension 11/21/2016    Ex-smoker 11/21/2016    Hyperlipidemia 11/21/2016    Angina pectoris 09/03/2015    Atherosclerosis of native coronary artery with angina pectoris 07/22/2015    Coronary arteriosclerosis in native artery 07/22/2015       ROS:  Feeling well. No dyspnea or chest pain on exertion. No abdominal pain, change in bowel habits, black or bloody stools. No urinary tract or prostatic symptoms. No neurological complaints.  Back pain, seems exertional  OBJECTIVE:   The patient appears well, alert, oriented x 3, in no distress.   /60   Pulse 82   Temp 98.5 °F (36.9 °C) (Oral)   Ht 5' 10" (1.778 m)   Wt 94.4 kg (208 lb 1.8 oz)   SpO2 95%   BMI 29.86 kg/m²   Wt Readings from Last 5 Encounters:   05/05/25 94.4 kg (208 lb 1.8 oz)   08/26/24 98.9 kg (218 lb 0.6 oz)   05/27/24 100.4 kg (221 lb 5.5 oz)   03/27/24 102.3 kg (225 lb 8.5 oz)   11/30/22 105.1 kg (231 lb 11.3 oz)       ENT normal.  Neck supple. No adenopathy or thyromegaly. LIAT. Lungs are clear, good air entry, no wheezes, rhonchi or rales. S1 and S2 normal, no murmurs, regular rate and rhythm. Abdomen is soft without tenderness, guarding, mass or organomegaly.  exam: deferred.  Extremities show no edema, normal peripheral pulses. Neurological is normal without focal findings.   Severe spinal changes and antalgic gait  ASSESSMENT:   1. Annual physical exam    2. Back pain, " unspecified back location, unspecified back pain laterality, unspecified chronicity          PLAN:   Counseled on age appropriate medical preventative services, including age appropriate cancer screenings, over all nutritional health, need for a consistent exercise regimen and an over all push towards maintaining a vigorous and active lifestyle.  Counseled on age appropriate vaccines and discussed upcoming health care needs based on age/gender.  Spent time with patient counseling on need for a good patient/doctor relationship moving forward.  Discussed use of common OTC medications and supplements.  Discussed common dietary aids and use of caffeine and the need for good sleep hygiene and stress management.    Problem List Items Addressed This Visit    None  Visit Diagnoses         Annual physical exam    -  Primary      Back pain, unspecified back location, unspecified back pain laterality, unspecified chronicity        Relevant Medications    HYDROcodone-acetaminophen (NORCO)  mg per tablet (Start on 5/26/2025)    HYDROcodone-acetaminophen (NORCO)  mg per tablet (Start on 6/24/2025)    HYDROcodone-acetaminophen (NORCO)  mg per tablet (Start on 7/22/2025)                   [1]   Current Outpatient Medications   Medication Sig Dispense Refill    isosorbide mononitrate (IMDUR) 30 MG 24 hr tablet Take 60 mg by mouth.      methen-m.blue-s.phos-phsal-hyo (URIBEL) 118-10-40.8-36 mg Cap Take 1 capsule by mouth 4 (four) times daily as needed.      aspirin 325 MG tablet Take 1 tablet (325 mg total) by mouth once daily.  0    clopidogrel (PLAVIX) 75 mg tablet TAKE ONE TABLET BY MOUTH EVERY DAY 30 tablet 5    cyanocobalamin 1,000 mcg/mL injection Inject 1 mL (1,000 mcg total) into the skin once a week. 4 mL 1    ergocalciferol (ERGOCALCIFEROL) 50,000 unit Cap Take 50,000 Units by mouth every 7 days.      esomeprazole (NEXIUM) 40 MG capsule Take 40 mg by mouth every morning.      glucosamine-chondroitin  500-400 mg tablet Take 1 tablet by mouth 3 (three) times daily.      hydroCHLOROthiazide (HYDRODIURIL) 25 MG tablet TAKE ONE TABLET BY MOUTH EVERY DAY 30 tablet 3    [START ON 2025] HYDROcodone-acetaminophen (NORCO)  mg per tablet Take 1 tablet by mouth every 6 (six) hours as needed for Pain. 28 tablet 0    [START ON 2025] HYDROcodone-acetaminophen (NORCO)  mg per tablet Take 1 tablet by mouth every 6 (six) hours as needed for Pain. 28 tablet 0    [START ON 2025] HYDROcodone-acetaminophen (NORCO)  mg per tablet Take 1 tablet by mouth every 6 (six) hours as needed for Pain. 28 tablet 0    metoprolol succinate (TOPROL-XL) 50 MG 24 hr tablet TAKE ONE TABLET BY MOUTH TWICE DAILY 60 tablet 3    multivit-mins/iron/folic/lycop (CENTRUM MEN ORAL) Take 1 tablet by mouth once daily.      NIFEdipine (PROCARDIA-XL) 30 MG (OSM) 24 hr tablet TAKE ONE TABLET BY MOUTH TWICE DAILY 60 tablet 2    nitroGLYCERIN (NITROSTAT) 0.4 MG SL tablet TAKE 1 UNDER THE TONGUE EVERY 5 MINUTES FOR 3 DOSES AS NEEDED FOR CHEST PAIN/IF NO RELIEF CALL ER 25 tablet 0    NIVA-FOL 2.5-25-2 mg Tab TAKE ONE TABLET BY MOUTH EVERY DAY 30 tablet 5    PRALUENT  mg/mL PnIj SMARTSI Milligram(s) SUB-Q Every 2 Weeks      ranolazine (RANEXA) 1,000 mg Tb12 Take 1,000 mg by mouth 2 (two) times daily.      REPATHA SURECLICK 140 mg/mL PnIj SMARTSI pre-filled pen syringe SUB-Q Every 2 Weeks      rosuvastatin (CRESTOR) 20 MG tablet TAKE ONE TABLET BY MOUTH EVERY DAY 30 tablet 6    semaglutide, weight loss, 2.4 mg/0.75 mL PnIj COMPOUNDED INJECTION:5mg/mL-3mL (15mg) use as directed maximum 50 units weekly 3 mL 5     No current facility-administered medications for this visit.

## 2025-05-20 NOTE — PLAN OF CARE
Rigo Garcia has met all discharge criteria from Phase II. Vital Signs are stable, ambulating  without difficulty. Discharge instructions given, patient verbalized understanding. Discharged from facility via wheelchair in stable condition.       
None

## 2025-07-02 RX ORDER — CYANOCOBALAMIN 1000 UG/ML
INJECTION, SOLUTION INTRAMUSCULAR; SUBCUTANEOUS
Qty: 4 ML | Refills: 1 | Status: SHIPPED | OUTPATIENT
Start: 2025-07-02

## 2025-07-02 NOTE — TELEPHONE ENCOUNTER
Refill Routing Note   Medication(s) are not appropriate for processing by [facility] for the following reason(s):        Outside of protocol    [facility] action(s):  Route               Appointments  past 12m or future 3m with PCP    Date Provider   Last Visit   5/5/2025 Ulises Madrigal MD   Next Visit   8/13/2025 Ulises Madrigal MD   ED visits in past 90 days: 0        Note composed:9:19 AM 07/02/2025

## 2025-08-06 ENCOUNTER — PATIENT MESSAGE (OUTPATIENT)
Dept: FAMILY MEDICINE | Facility: CLINIC | Age: 65
End: 2025-08-06
Payer: MEDICARE